# Patient Record
Sex: MALE | Race: AMERICAN INDIAN OR ALASKA NATIVE | NOT HISPANIC OR LATINO | URBAN - METROPOLITAN AREA
[De-identification: names, ages, dates, MRNs, and addresses within clinical notes are randomized per-mention and may not be internally consistent; named-entity substitution may affect disease eponyms.]

---

## 2024-10-09 DIAGNOSIS — I71.40 AAA (ABDOMINAL AORTIC ANEURYSM): Primary | ICD-10-CM

## 2024-10-11 RX ORDER — LOSARTAN POTASSIUM 50 MG/1
50 TABLET ORAL
COMMUNITY
Start: 2024-04-23 | End: 2024-10-14

## 2024-10-11 RX ORDER — CIPROFLOXACIN 500 MG/1
500 TABLET ORAL 2 TIMES DAILY
COMMUNITY
Start: 2024-05-10 | End: 2024-10-14

## 2024-10-11 RX ORDER — APIXABAN 5 MG/1
5 TABLET, FILM COATED ORAL 2 TIMES DAILY
COMMUNITY
Start: 2024-06-18 | End: 2024-10-14

## 2024-10-11 RX ORDER — ALPRAZOLAM 0.5 MG/1
1 TABLET ORAL NIGHTLY PRN
COMMUNITY
Start: 2024-09-05

## 2024-10-11 RX ORDER — CARVEDILOL 3.12 MG/1
3.12 TABLET ORAL
COMMUNITY
Start: 2024-07-25 | End: 2024-10-14

## 2024-10-11 RX ORDER — MOXIFLOXACIN 5 MG/ML
SOLUTION/ DROPS OPHTHALMIC
COMMUNITY
Start: 2024-08-29 | End: 2024-10-14

## 2024-10-11 RX ORDER — TEMAZEPAM 30 MG/1
1 CAPSULE ORAL NIGHTLY PRN
COMMUNITY
Start: 2024-09-20

## 2024-10-11 RX ORDER — LISINOPRIL 5 MG/1
1 TABLET ORAL EVERY MORNING
COMMUNITY
Start: 2024-07-25

## 2024-10-11 RX ORDER — IBUPROFEN 200 MG
1 TABLET ORAL
COMMUNITY
Start: 2024-06-18 | End: 2024-10-14

## 2024-10-11 RX ORDER — ATORVASTATIN CALCIUM 40 MG/1
1 TABLET, FILM COATED ORAL EVERY MORNING
COMMUNITY
Start: 2024-09-14

## 2024-10-11 RX ORDER — CLOPIDOGREL BISULFATE 75 MG/1
1 TABLET ORAL EVERY MORNING
COMMUNITY
Start: 2024-07-25 | End: 2024-10-14

## 2024-10-11 RX ORDER — TORSEMIDE 20 MG/1
20 TABLET ORAL DAILY
COMMUNITY
Start: 2024-06-05 | End: 2024-10-14

## 2024-10-11 RX ORDER — RANOLAZINE 500 MG/1
500 TABLET, EXTENDED RELEASE ORAL 2 TIMES DAILY
COMMUNITY
Start: 2024-06-05 | End: 2024-10-14

## 2024-10-11 RX ORDER — SULFAMETHOXAZOLE AND TRIMETHOPRIM 800; 160 MG/1; MG/1
1 TABLET ORAL 2 TIMES DAILY
COMMUNITY
Start: 2024-05-10 | End: 2024-10-14

## 2024-10-11 RX ORDER — IPRATROPIUM BROMIDE AND ALBUTEROL 20; 100 UG/1; UG/1
1 SPRAY, METERED RESPIRATORY (INHALATION) 4 TIMES DAILY
COMMUNITY

## 2024-10-11 RX ORDER — RAMELTEON 8 MG/1
8 TABLET ORAL NIGHTLY
COMMUNITY
Start: 2024-06-26 | End: 2024-10-14

## 2024-10-11 RX ORDER — BUDESONIDE, GLYCOPYRROLATE, AND FORMOTEROL FUMARATE 160; 9; 4.8 UG/1; UG/1; UG/1
AEROSOL, METERED RESPIRATORY (INHALATION)
COMMUNITY

## 2024-10-11 RX ORDER — PANTOPRAZOLE SODIUM 40 MG/1
1 TABLET, DELAYED RELEASE ORAL EVERY MORNING
COMMUNITY
Start: 2024-09-14

## 2024-10-11 RX ORDER — METOPROLOL SUCCINATE 25 MG/1
25 TABLET, EXTENDED RELEASE ORAL
COMMUNITY
Start: 2024-06-05 | End: 2024-10-14

## 2024-10-11 RX ORDER — TORSEMIDE 10 MG/1
1 TABLET ORAL EVERY MORNING
COMMUNITY
Start: 2024-09-18

## 2024-10-11 RX ORDER — ROSUVASTATIN CALCIUM 20 MG/1
20 TABLET, COATED ORAL
COMMUNITY
Start: 2024-04-23 | End: 2024-10-14

## 2024-10-11 RX ORDER — PREDNISOLONE ACETATE 10 MG/ML
SUSPENSION/ DROPS OPHTHALMIC
COMMUNITY
Start: 2024-08-29 | End: 2024-10-14

## 2024-10-14 ENCOUNTER — OFFICE VISIT (OUTPATIENT)
Dept: VASCULAR SURGERY | Facility: CLINIC | Age: 64
End: 2024-10-14
Payer: MEDICARE

## 2024-10-14 VITALS
SYSTOLIC BLOOD PRESSURE: 141 MMHG | HEART RATE: 54 BPM | HEIGHT: 66 IN | BODY MASS INDEX: 18.48 KG/M2 | DIASTOLIC BLOOD PRESSURE: 89 MMHG | WEIGHT: 115 LBS

## 2024-10-14 DIAGNOSIS — I25.10 CORONARY ARTERY DISEASE, UNSPECIFIED VESSEL OR LESION TYPE, UNSPECIFIED WHETHER ANGINA PRESENT, UNSPECIFIED WHETHER NATIVE OR TRANSPLANTED HEART: ICD-10-CM

## 2024-10-14 DIAGNOSIS — I71.43 INFRARENAL ABDOMINAL AORTIC ANEURYSM (AAA) WITHOUT RUPTURE: Primary | ICD-10-CM

## 2024-10-14 PROCEDURE — 99204 OFFICE O/P NEW MOD 45 MIN: CPT | Mod: ,,, | Performed by: SURGERY

## 2024-10-14 NOTE — H&P (VIEW-ONLY)
"    [facility] Vascular - Clinic Note  Gerhard Platt MD      Patient Name: Heriberto Qiu                   : 1960      MRN: [id number]   Visit Date: 10/14/2024       History Present Illness     Reason for Visit: Abdominal Aortic Aneurysm    Mr. Qiu presents to the clinic for evaluation of abdominal aortic aneurysm.  Since 64-year-old male referred over by Dr. Bay in [place] for abdominal aortic aneurysm repair.  He states he has known about this aneurysm for several years.  He denies any family history of aneurysmal disease.  He is a former smoker who quit 1 year ago.  He was evaluated in [place] felt to need a higher level of care for aneurysm repair.  He does have coronary artery disease with triple-vessel disease being managed medically as he was deemed not a candidate for intervention.      REVIEW OF SYSTEMS:  12 point review of systems conducted, negative except as stated in the history of present illness. See HPI for details.        Physical Exam      Vitals:    10/14/24 1104 10/14/24 1105   BP: 134/86 (!) 141/89   Pulse: (!) 56 (!) 54   Weight: 52.2 kg (115 lb)    Height: 5' 6" (1.676 m)           General: well-nourished, no acute distress, and healthy appearing, alert, pleasant, conversant, and oriented  Neurologic: cranial nerves are grossly intact, no neurologic deficits, no motor deficits, and no sensory deficits  Neck/Chest: normal , soft without lymphadenopathy, and no carotid bruits noted  Respiratory: breathing easily, without respiratory distress, and normal breath sounds  Abdomen: normal, soft, and palpable pulsatile mass  Cardiology: regular rate and rhythm and no audible murmur    Upper Extremity Arterial Exam:   Right - radial is palpable and brachial is palpable  Left - radial is palpable and brachial is palpable    Lower Extremity Arterial Exam:  Right - femoral is palpable and dorsalis pedis is palpable  Left - femoral is palpable and dorsalis pedis is " palpable    Musculoskeletal:   Upper Extremity: normal bilateral hand function  Lower Extremity: no edema present to bilateral lower extremities           Assessment and Plan     Mr. Qiu is a 64 y.o. male with a 6 cm AAA.  I do think he is amenable to endovascular therapy.  It will be slightly more complex due to difficulty with common femoral and external iliac access.  He also has a short neck, however I think it is amenable to treatment with a stent graft and endo anchors.  I did discuss with him we will need to get cardiac risk assessment prior to proceeding.  He did not want want to return for another visit due to difficulty traveling from [place].  I did state that we can call to confirm surgery day and whether to proceed once we have risk assessment.  I did explain this procedure, risks and benefits to him.  We will determine next steps once has been risk stratified by Cardiology        1. AAA (abdominal aortic aneurysm)  - Ambulatory referral/consult to Vascular Surgery    2. Coronary artery disease, unspecified vessel or lesion type, unspecified whether angina present, unspecified whether native or transplanted heart          Imaging Obtained/Reviewed   Study: CTA aorta/runoff  Date:   10/3/24  Shows a 6 cm infrarenal AAA.  This does have a relatively short neck.  Common iliacs are amenable to endovascular therapy.  His external iliacs are somewhat small and calcified as are his common femoral arteries.      Medical History     Past Medical History:   Diagnosis Date    AAA (abdominal aortic aneurysm)     CAD (coronary artery disease)     CHF (congestive heart failure)     COPD (chronic obstructive pulmonary disease)     HTN (hypertension)     Ischemic cardiomyopathy 10/04/2024    Paroxysmal atrial fibrillation     PVD (peripheral vascular disease)      Past Surgical History:   Procedure Laterality Date    SMALL INTESTINE SURGERY       Family History   Problem Relation Name Age of Onset     Heart disease Mother      Heart attack Father       Social History     Socioeconomic History    Marital status: Other   Tobacco Use    Smoking status: Former     Current packs/day: 0.00     Types: Cigarettes     Quit date:      Years since quittin.7    Smokeless tobacco: Never   Substance and Sexual Activity    Alcohol use: Never     Current Outpatient Medications   Medication Instructions    ALPRAZolam (XANAX) 0.5 MG tablet 1 tablet, Nightly PRN    atorvastatin (LIPITOR) 40 MG tablet 1 tablet, Every morning    BREZTRI AEROSPHERE 160-9-4.8 mcg/actuation HFAA Inhale into the lungs.    COMBIVENT RESPIMAT  mcg/actuation inhaler 1 puff, 4 times daily    lisinopriL (PRINIVIL,ZESTRIL) 5 MG tablet 1 tablet, Every morning    pantoprazole (PROTONIX) 40 MG tablet 1 tablet, Every morning    temazepam (RESTORIL) 30 mg capsule 1 capsule, Nightly PRN    torsemide (DEMADEX) 10 MG Tab 1 tablet, Every morning     Review of patient's allergies indicates:   Allergen Reactions    Cefadroxil Hives       Patient Care Team:  Juan Carlos Oshea MD as PCP - General  Lory Bay MD (Cardiovascular Disease)  Gerhard Platt MD as Consulting Physician (Vascular Surgery)        No follow-ups on file. In addition to their scheduled follow up, the patient has also been instructed to follow up on as needed basis.     No future appointments.

## 2024-10-14 NOTE — PROGRESS NOTES
"    [facility] Vascular - Clinic Note  Gerhard Platt MD      Patient Name: Heriberto Qiu                   : 1960      MRN: [id number]   Visit Date: 10/14/2024       History Present Illness     Reason for Visit: Abdominal Aortic Aneurysm    Mr. Qiu presents to the clinic for evaluation of abdominal aortic aneurysm.  Since 64-year-old male referred over by Dr. Bay in [place] for abdominal aortic aneurysm repair.  He states he has known about this aneurysm for several years.  He denies any family history of aneurysmal disease.  He is a former smoker who quit 1 year ago.  He was evaluated in [place] felt to need a higher level of care for aneurysm repair.  He does have coronary artery disease with triple-vessel disease being managed medically as he was deemed not a candidate for intervention.      REVIEW OF SYSTEMS:  12 point review of systems conducted, negative except as stated in the history of present illness. See HPI for details.        Physical Exam      Vitals:    10/14/24 1104 10/14/24 1105   BP: 134/86 (!) 141/89   Pulse: (!) 56 (!) 54   Weight: 52.2 kg (115 lb)    Height: 5' 6" (1.676 m)           General: well-nourished, no acute distress, and healthy appearing, alert, pleasant, conversant, and oriented  Neurologic: cranial nerves are grossly intact, no neurologic deficits, no motor deficits, and no sensory deficits  Neck/Chest: normal , soft without lymphadenopathy, and no carotid bruits noted  Respiratory: breathing easily, without respiratory distress, and normal breath sounds  Abdomen: normal, soft, and palpable pulsatile mass  Cardiology: regular rate and rhythm and no audible murmur    Upper Extremity Arterial Exam:   Right - radial is palpable and brachial is palpable  Left - radial is palpable and brachial is palpable    Lower Extremity Arterial Exam:  Right - femoral is palpable and dorsalis pedis is palpable  Left - femoral is palpable and dorsalis pedis is " palpable    Musculoskeletal:   Upper Extremity: normal bilateral hand function  Lower Extremity: no edema present to bilateral lower extremities           Assessment and Plan     Mr. Qiu is a 64 y.o. male with a 6 cm AAA.  I do think he is amenable to endovascular therapy.  It will be slightly more complex due to difficulty with common femoral and external iliac access.  He also has a short neck, however I think it is amenable to treatment with a stent graft and endo anchors.  I did discuss with him we will need to get cardiac risk assessment prior to proceeding.  He did not want want to return for another visit due to difficulty traveling from [place].  I did state that we can call to confirm surgery day and whether to proceed once we have risk assessment.  I did explain this procedure, risks and benefits to him.  We will determine next steps once has been risk stratified by Cardiology        1. AAA (abdominal aortic aneurysm)  - Ambulatory referral/consult to Vascular Surgery    2. Coronary artery disease, unspecified vessel or lesion type, unspecified whether angina present, unspecified whether native or transplanted heart          Imaging Obtained/Reviewed   Study: CTA aorta/runoff  Date:   10/3/24  Shows a 6 cm infrarenal AAA.  This does have a relatively short neck.  Common iliacs are amenable to endovascular therapy.  His external iliacs are somewhat small and calcified as are his common femoral arteries.      Medical History     Past Medical History:   Diagnosis Date    AAA (abdominal aortic aneurysm)     CAD (coronary artery disease)     CHF (congestive heart failure)     COPD (chronic obstructive pulmonary disease)     HTN (hypertension)     Ischemic cardiomyopathy 10/04/2024    Paroxysmal atrial fibrillation     PVD (peripheral vascular disease)      Past Surgical History:   Procedure Laterality Date    SMALL INTESTINE SURGERY       Family History   Problem Relation Name Age of Onset    Heart disease  Mother      Heart attack Father       Social History     Socioeconomic History    Marital status: Other   Tobacco Use    Smoking status: Former     Current packs/day: 0.00     Types: Cigarettes     Quit date:      Years since quittin.7    Smokeless tobacco: Never   Substance and Sexual Activity    Alcohol use: Never     Current Outpatient Medications   Medication Instructions    ALPRAZolam (XANAX) 0.5 MG tablet 1 tablet, Nightly PRN    atorvastatin (LIPITOR) 40 MG tablet 1 tablet, Every morning    BREZTRI AEROSPHERE 160-9-4.8 mcg/actuation HFAA Inhale into the lungs.    COMBIVENT RESPIMAT  mcg/actuation inhaler 1 puff, 4 times daily    lisinopriL (PRINIVIL,ZESTRIL) 5 MG tablet 1 tablet, Every morning    pantoprazole (PROTONIX) 40 MG tablet 1 tablet, Every morning    temazepam (RESTORIL) 30 mg capsule 1 capsule, Nightly PRN    torsemide (DEMADEX) 10 MG Tab 1 tablet, Every morning     Review of patient's allergies indicates:   Allergen Reactions    Cefadroxil Hives       Patient Care Team:  Juan Carlos Oshea MD as PCP - General  Lory Bay MD (Cardiovascular Disease)  Gerhard Platt MD as Consulting Physician (Vascular Surgery)        No follow-ups on file. In addition to their scheduled follow up, the patient has also been instructed to follow up on as needed basis.     No future appointments.

## 2024-10-14 NOTE — LETTER
[facility] Clinic           Medical Record Request  Date: 10/11/2024      PLEASE PROVIDE ALL INFORMATION AS REQUESTED FOR:      Mr. Heriberto Qiu    : 1960       Please PUSH images to [facility] on [employer] from recent CT angiogram aorta with runoff obtained on 10/3/2024.    Patient has upcoming appointment 10/14/24.      Thank you for your help in this matter.    Sincerely,        Gerhard Platt MD  [facility]  Phone: [phone]  Fax: [phone]

## 2024-10-23 DIAGNOSIS — I71.43 INFRARENAL ABDOMINAL AORTIC ANEURYSM (AAA) WITHOUT RUPTURE: Primary | ICD-10-CM

## 2024-10-25 DIAGNOSIS — I71.43 INFRARENAL ABDOMINAL AORTIC ANEURYSM (AAA) WITHOUT RUPTURE: Primary | ICD-10-CM

## 2024-10-25 RX ORDER — NAPROXEN SODIUM 220 MG/1
1 TABLET, FILM COATED ORAL EVERY MORNING
COMMUNITY
Start: 2024-10-24 | End: 2025-10-24

## 2024-10-25 RX ORDER — SODIUM CHLORIDE 9 MG/ML
INJECTION, SOLUTION INTRAVENOUS CONTINUOUS
OUTPATIENT
Start: 2024-10-25

## 2024-10-25 RX ORDER — CLOPIDOGREL BISULFATE 75 MG/1
1 TABLET ORAL EVERY MORNING
COMMUNITY
Start: 2024-10-24

## 2024-10-30 ENCOUNTER — ANESTHESIA EVENT (OUTPATIENT)
Dept: SURGERY | Facility: HOSPITAL | Age: 64
End: 2024-10-30
Payer: MEDICARE

## 2024-11-04 ENCOUNTER — TELEPHONE (OUTPATIENT)
Dept: VASCULAR SURGERY | Facility: CLINIC | Age: 64
End: 2024-11-04
Payer: COMMERCIAL

## 2024-11-04 NOTE — PRE-PROCEDURE INSTRUCTIONS
"[facility]: Outpatient Surgery  Preprocedure Check-In Instructions     Your surgeon's office will call with your time of arrival.    We ask patients to arrive about 2 hours before surgery to allow for enough time to review your health history & medications, start your IV, complete any outstanding labwork or tests, and meet your Anesthesiologist.    Expectations: "Because of inconsistent procedure completion times, an unexpected wait may occur. The Physicians would like you to be here to prepare in the event they run ahead of time. We will make you as comfortable as possible and keep you informed. We apologize in advance if this happens."    You will arrive at [facility], [address], [place].  Enter through the [place] entrance next to the Emergency Room, and come to the 6th floor to the Outpatient Surgery Department.     Visitory Policy:  You are allowed 2 adult visitors to be with you in the hospital. All hospital visitors should be in good current health.  No small children.     What to Bring:  Please have your ID, insurance cards, and all home medication bottles with you at check in.  Bring your CPAP machine if one is used at home.     Fasting:  Nothing to eat after midnight the night before your procedure. This includes no gum and/or tobacco products. You may have clear liquids limited to only: WATER, GATORADE, POWERADE and children can also have PEDIALYTE AND/OR APPLE JUICE , up until 2 hours before your arrival time.   Follow your doctor's instructions for taking any medications on the morning of your procedure.  If no instructions for taking medications were given, do not take any medications but bring your medications in their bottles to your procedure check in.     Follow your doctor's preoperative instructions regarding skin prep, bowel prep, bathing, or showering prior to your procedure.  If any special soaps were provided to you, please use according to " your doctor's instructions. If no instructions were given from your doctor, take a good bath or shower with antibacterial soap the night before and the morning of your procedure.  On the morning of procedure, wear loose, comfortable clothing.  No lotions, makeup, perfumes, colognes, deodorant, or jewelry to your procedure.  Removable items (glasses, contact lenses, dentures, retainers, hearing aids) need to be removed for your procedure.  Bring your storage containers for these items if you wear them.     Artificial nails, body jewelry, eyelash extensions, and/or hair extensions with metal clips are not allowed during your surgery.  If you currently wear any of these items, please arrange for them to be removed prior to your arrival to the hospital.     Outpatient or Same Day Surgeries:  Any patients receiving sedation/anesthesia are advised not to drive for 24 hours after their procedure.  We do not allow patients to drive themselves home after discharge.  If you are going home after your procedure, please have someone available to drive you home from the hospital.        You may call the Outpatient Surgery Department at [phone] with any questions or concerns.  We are looking forward to meeting you and taking great care of you for your procedure.  Thank you for choosing [facility] for your surgical needs.

## 2024-11-04 NOTE — CLINICAL REVIEW
outside BMP and cardiology documents reviewed. Awaiting other preop testing. sd //     Plavix to continue. CBC/EKG/CXR reviewed. cardiology notes utd. Angio noted. CRA provided. chart review complete. ccv

## 2024-11-05 ENCOUNTER — HOSPITAL ENCOUNTER (INPATIENT)
Facility: HOSPITAL | Age: 64
LOS: 5 days | Discharge: HOME HEALTH | DRG: 268 | End: 2024-11-10
Attending: SURGERY | Admitting: SURGERY
Payer: MEDICARE

## 2024-11-05 ENCOUNTER — ANESTHESIA (OUTPATIENT)
Dept: SURGERY | Facility: HOSPITAL | Age: 64
End: 2024-11-05
Payer: MEDICARE

## 2024-11-05 DIAGNOSIS — I49.9 ABNORMAL HEART RHYTHM: ICD-10-CM

## 2024-11-05 DIAGNOSIS — I71.43 INFRARENAL ABDOMINAL AORTIC ANEURYSM (AAA) WITHOUT RUPTURE: Primary | ICD-10-CM

## 2024-11-05 LAB
ABORH RETYPE: NORMAL
ALBUMIN SERPL-MCNC: 3.3 G/DL (ref 3.4–4.8)
ALBUMIN/GLOB SERPL: 0.9 RATIO (ref 1.1–2)
ALP SERPL-CCNC: 70 UNIT/L (ref 40–150)
ALT SERPL-CCNC: 6 UNIT/L (ref 0–55)
ANION GAP SERPL CALC-SCNC: 16 MEQ/L
AST SERPL-CCNC: 15 UNIT/L (ref 5–34)
BILIRUB SERPL-MCNC: 0.4 MG/DL
BUN SERPL-MCNC: 68.8 MG/DL (ref 8.4–25.7)
CALCIUM SERPL-MCNC: 9.5 MG/DL (ref 8.8–10)
CHLORIDE SERPL-SCNC: 103 MMOL/L (ref 98–107)
CO2 SERPL-SCNC: 22 MMOL/L (ref 23–31)
CREAT SERPL-MCNC: 2.91 MG/DL (ref 0.72–1.25)
CREAT/UREA NIT SERPL: 24
GFR SERPLBLD CREATININE-BSD FMLA CKD-EPI: 23 ML/MIN/1.73/M2
GLOBULIN SER-MCNC: 3.7 GM/DL (ref 2.4–3.5)
GLUCOSE SERPL-MCNC: 147 MG/DL (ref 82–115)
GROUP & RH: NORMAL
INDIRECT COOMBS: NORMAL
MAGNESIUM SERPL-MCNC: 2.4 MG/DL (ref 1.6–2.6)
POC ACTIVATED CLOTTING TIME K: 244 SEC (ref 74–137)
POTASSIUM SERPL-SCNC: 4.1 MMOL/L (ref 3.5–5.1)
PROT SERPL-MCNC: 7 GM/DL (ref 5.8–7.6)
SAMPLE: ABNORMAL
SODIUM SERPL-SCNC: 141 MMOL/L (ref 136–145)
SPECIMEN OUTDATE: NORMAL

## 2024-11-05 PROCEDURE — 63600175 PHARM REV CODE 636 W HCPCS: Performed by: ANESTHESIOLOGY

## 2024-11-05 PROCEDURE — 99900035 HC TECH TIME PER 15 MIN (STAT)

## 2024-11-05 PROCEDURE — 25000003 PHARM REV CODE 250: Performed by: SURGERY

## 2024-11-05 PROCEDURE — 36000706: Performed by: SURGERY

## 2024-11-05 PROCEDURE — 76937 US GUIDE VASCULAR ACCESS: CPT | Performed by: NURSE ANESTHETIST, CERTIFIED REGISTERED

## 2024-11-05 PROCEDURE — 27000221 HC OXYGEN, UP TO 24 HOURS

## 2024-11-05 PROCEDURE — 71000033 HC RECOVERY, INTIAL HOUR: Performed by: SURGERY

## 2024-11-05 PROCEDURE — 63600175 PHARM REV CODE 636 W HCPCS: Performed by: SURGERY

## 2024-11-05 PROCEDURE — D9220A PRA ANESTHESIA: Mod: CRNA,,, | Performed by: NURSE ANESTHETIST, CERTIFIED REGISTERED

## 2024-11-05 PROCEDURE — 25000003 PHARM REV CODE 250: Performed by: ANESTHESIOLOGY

## 2024-11-05 PROCEDURE — 86901 BLOOD TYPING SEROLOGIC RH(D): CPT | Performed by: SURGERY

## 2024-11-05 PROCEDURE — 04V03DZ RESTRICTION OF ABDOMINAL AORTA WITH INTRALUMINAL DEVICE, PERCUTANEOUS APPROACH: ICD-10-PCS | Performed by: SURGERY

## 2024-11-05 PROCEDURE — 27201423 OPTIME MED/SURG SUP & DEVICES STERILE SUPPLY: Performed by: SURGERY

## 2024-11-05 PROCEDURE — 37000008 HC ANESTHESIA 1ST 15 MINUTES: Performed by: SURGERY

## 2024-11-05 PROCEDURE — 21400001 HC TELEMETRY ROOM

## 2024-11-05 PROCEDURE — 37000009 HC ANESTHESIA EA ADD 15 MINS: Performed by: SURGERY

## 2024-11-05 PROCEDURE — C1769 GUIDE WIRE: HCPCS | Performed by: SURGERY

## 2024-11-05 PROCEDURE — 11000001 HC ACUTE MED/SURG PRIVATE ROOM

## 2024-11-05 PROCEDURE — 25500020 PHARM REV CODE 255: Performed by: SURGERY

## 2024-11-05 PROCEDURE — 04FH3ZZ FRAGMENTATION OF RIGHT EXTERNAL ILIAC ARTERY, PERCUTANEOUS APPROACH: ICD-10-PCS | Performed by: SURGERY

## 2024-11-05 PROCEDURE — 27800903 OPTIME MED/SURG SUP & DEVICES OTHER IMPLANTS: Performed by: SURGERY

## 2024-11-05 PROCEDURE — 71000039 HC RECOVERY, EACH ADD'L HOUR: Performed by: SURGERY

## 2024-11-05 PROCEDURE — 36000707: Performed by: SURGERY

## 2024-11-05 PROCEDURE — C1760 CLOSURE DEV, VASC: HCPCS | Performed by: SURGERY

## 2024-11-05 PROCEDURE — 86923 COMPATIBILITY TEST ELECTRIC: CPT | Performed by: SURGERY

## 2024-11-05 PROCEDURE — 93005 ELECTROCARDIOGRAM TRACING: CPT

## 2024-11-05 PROCEDURE — 36415 COLL VENOUS BLD VENIPUNCTURE: CPT | Performed by: SURGERY

## 2024-11-05 PROCEDURE — C1768 GRAFT, VASCULAR: HCPCS | Performed by: SURGERY

## 2024-11-05 PROCEDURE — 63600175 PHARM REV CODE 636 W HCPCS: Performed by: NURSE ANESTHETIST, CERTIFIED REGISTERED

## 2024-11-05 PROCEDURE — 04CK0ZZ EXTIRPATION OF MATTER FROM RIGHT FEMORAL ARTERY, OPEN APPROACH: ICD-10-PCS | Performed by: SURGERY

## 2024-11-05 PROCEDURE — 25000003 PHARM REV CODE 250: Performed by: NURSE ANESTHETIST, CERTIFIED REGISTERED

## 2024-11-05 PROCEDURE — C1894 INTRO/SHEATH, NON-LASER: HCPCS | Performed by: SURGERY

## 2024-11-05 PROCEDURE — 93010 ELECTROCARDIOGRAM REPORT: CPT | Mod: ,,, | Performed by: INTERNAL MEDICINE

## 2024-11-05 PROCEDURE — 80053 COMPREHEN METABOLIC PANEL: CPT | Performed by: SURGERY

## 2024-11-05 PROCEDURE — 36620 INSERTION CATHETER ARTERY: CPT | Mod: 59,,, | Performed by: ANESTHESIOLOGY

## 2024-11-05 PROCEDURE — 04UK0KZ SUPPLEMENT RIGHT FEMORAL ARTERY WITH NONAUTOLOGOUS TISSUE SUBSTITUTE, OPEN APPROACH: ICD-10-PCS | Performed by: SURGERY

## 2024-11-05 PROCEDURE — 04FJ3ZZ FRAGMENTATION OF LEFT EXTERNAL ILIAC ARTERY, PERCUTANEOUS APPROACH: ICD-10-PCS | Performed by: SURGERY

## 2024-11-05 PROCEDURE — 76937 US GUIDE VASCULAR ACCESS: CPT | Mod: 26,,, | Performed by: ANESTHESIOLOGY

## 2024-11-05 PROCEDURE — D9220A PRA ANESTHESIA: Mod: ANES,,, | Performed by: ANESTHESIOLOGY

## 2024-11-05 PROCEDURE — 83735 ASSAY OF MAGNESIUM: CPT | Performed by: SURGERY

## 2024-11-05 PROCEDURE — C1725 CATH, TRANSLUMIN NON-LASER: HCPCS | Performed by: SURGERY

## 2024-11-05 DEVICE — XENOSURE BIOLOGIC PATCH, 0.8CM X 8CM, EIFU
Type: IMPLANTABLE DEVICE | Site: AORTA | Status: FUNCTIONAL
Brand: XENOSURE BIOLOGIC PATCH

## 2024-11-05 DEVICE — IMPLANTABLE DEVICE: Type: IMPLANTABLE DEVICE | Site: AORTA | Status: FUNCTIONAL

## 2024-11-05 RX ORDER — NAPROXEN SODIUM 220 MG/1
81 TABLET, FILM COATED ORAL EVERY MORNING
Status: DISCONTINUED | OUTPATIENT
Start: 2024-11-06 | End: 2024-11-06

## 2024-11-05 RX ORDER — HYDROCODONE BITARTRATE AND ACETAMINOPHEN 5; 325 MG/1; MG/1
1 TABLET ORAL EVERY 4 HOURS PRN
Status: DISCONTINUED | OUTPATIENT
Start: 2024-11-05 | End: 2024-11-10 | Stop reason: HOSPADM

## 2024-11-05 RX ORDER — HEPARIN SODIUM 1000 [USP'U]/ML
INJECTION, SOLUTION INTRAVENOUS; SUBCUTANEOUS
Status: DISCONTINUED | OUTPATIENT
Start: 2024-11-05 | End: 2024-11-05

## 2024-11-05 RX ORDER — FAMOTIDINE 10 MG/ML
20 INJECTION INTRAVENOUS ONCE
Status: COMPLETED | OUTPATIENT
Start: 2024-11-05 | End: 2024-11-05

## 2024-11-05 RX ORDER — MUPIROCIN 20 MG/G
OINTMENT TOPICAL 2 TIMES DAILY
Status: DISCONTINUED | OUTPATIENT
Start: 2024-11-05 | End: 2024-11-10 | Stop reason: HOSPADM

## 2024-11-05 RX ORDER — SODIUM CHLORIDE 0.9 % (FLUSH) 0.9 %
10 SYRINGE (ML) INJECTION
Status: DISCONTINUED | OUTPATIENT
Start: 2024-11-05 | End: 2024-11-05 | Stop reason: HOSPADM

## 2024-11-05 RX ORDER — ONDANSETRON HYDROCHLORIDE 2 MG/ML
4 INJECTION, SOLUTION INTRAVENOUS EVERY 6 HOURS PRN
Status: DISCONTINUED | OUTPATIENT
Start: 2024-11-05 | End: 2024-11-10 | Stop reason: HOSPADM

## 2024-11-05 RX ORDER — HEPARIN SODIUM 200 [USP'U]/100ML
INJECTION, SOLUTION INTRAVENOUS
Status: DISCONTINUED | OUTPATIENT
Start: 2024-11-05 | End: 2024-11-05 | Stop reason: HOSPADM

## 2024-11-05 RX ORDER — LIDOCAINE HYDROCHLORIDE 10 MG/ML
1 INJECTION, SOLUTION EPIDURAL; INFILTRATION; INTRACAUDAL; PERINEURAL ONCE
Status: DISCONTINUED | OUTPATIENT
Start: 2024-11-05 | End: 2024-11-05 | Stop reason: HOSPADM

## 2024-11-05 RX ORDER — MIDAZOLAM HYDROCHLORIDE 1 MG/ML
INJECTION INTRAMUSCULAR; INTRAVENOUS
Status: DISCONTINUED | OUTPATIENT
Start: 2024-11-05 | End: 2024-11-05

## 2024-11-05 RX ORDER — VANCOMYCIN HCL IN 5 % DEXTROSE 1G/250ML
1000 PLASTIC BAG, INJECTION (ML) INTRAVENOUS
Status: DISCONTINUED | OUTPATIENT
Start: 2024-11-05 | End: 2024-11-05 | Stop reason: SDUPTHER

## 2024-11-05 RX ORDER — CALCIUM CHLORIDE INJECTION 100 MG/ML
INJECTION, SOLUTION INTRAVENOUS
Status: DISCONTINUED | OUTPATIENT
Start: 2024-11-05 | End: 2024-11-05

## 2024-11-05 RX ORDER — PROTAMINE SULFATE 10 MG/ML
INJECTION, SOLUTION INTRAVENOUS
Status: DISCONTINUED | OUTPATIENT
Start: 2024-11-05 | End: 2024-11-05

## 2024-11-05 RX ORDER — EPHEDRINE SULFATE 50 MG/ML
INJECTION, SOLUTION INTRAVENOUS
Status: DISCONTINUED | OUTPATIENT
Start: 2024-11-05 | End: 2024-11-05

## 2024-11-05 RX ORDER — ATORVASTATIN CALCIUM 40 MG/1
40 TABLET, FILM COATED ORAL EVERY MORNING
Status: DISCONTINUED | OUTPATIENT
Start: 2024-11-05 | End: 2024-11-06

## 2024-11-05 RX ORDER — GLUCAGON 1 MG
1 KIT INJECTION
Status: DISCONTINUED | OUTPATIENT
Start: 2024-11-05 | End: 2024-11-05 | Stop reason: HOSPADM

## 2024-11-05 RX ORDER — LIDOCAINE HYDROCHLORIDE 20 MG/ML
INJECTION, SOLUTION EPIDURAL; INFILTRATION; INTRACAUDAL; PERINEURAL
Status: DISCONTINUED | OUTPATIENT
Start: 2024-11-05 | End: 2024-11-05

## 2024-11-05 RX ORDER — ROCURONIUM BROMIDE 10 MG/ML
INJECTION, SOLUTION INTRAVENOUS
Status: DISCONTINUED | OUTPATIENT
Start: 2024-11-05 | End: 2024-11-05

## 2024-11-05 RX ORDER — GLYCOPYRROLATE 0.2 MG/ML
0.2 INJECTION INTRAMUSCULAR; INTRAVENOUS ONCE
Status: COMPLETED | OUTPATIENT
Start: 2024-11-05 | End: 2024-11-05

## 2024-11-05 RX ORDER — SODIUM CHLORIDE 9 MG/ML
INJECTION, SOLUTION INTRAVENOUS CONTINUOUS
Status: ACTIVE | OUTPATIENT
Start: 2024-11-05 | End: 2024-11-05

## 2024-11-05 RX ORDER — HYDROCODONE BITARTRATE AND ACETAMINOPHEN 5; 325 MG/1; MG/1
1 TABLET ORAL
Status: DISCONTINUED | OUTPATIENT
Start: 2024-11-05 | End: 2024-11-05 | Stop reason: HOSPADM

## 2024-11-05 RX ORDER — PROPOFOL 10 MG/ML
VIAL (ML) INTRAVENOUS
Status: DISCONTINUED | OUTPATIENT
Start: 2024-11-05 | End: 2024-11-05

## 2024-11-05 RX ORDER — CARVEDILOL 3.12 MG/1
3.12 TABLET ORAL 2 TIMES DAILY WITH MEALS
Status: DISCONTINUED | OUTPATIENT
Start: 2024-11-05 | End: 2024-11-10 | Stop reason: HOSPADM

## 2024-11-05 RX ORDER — FENTANYL CITRATE 50 UG/ML
25 INJECTION, SOLUTION INTRAMUSCULAR; INTRAVENOUS EVERY 5 MIN PRN
Status: DISCONTINUED | OUTPATIENT
Start: 2024-11-05 | End: 2024-11-05 | Stop reason: HOSPADM

## 2024-11-05 RX ORDER — DEXAMETHASONE SODIUM PHOSPHATE 4 MG/ML
INJECTION, SOLUTION INTRA-ARTICULAR; INTRALESIONAL; INTRAMUSCULAR; INTRAVENOUS; SOFT TISSUE
Status: DISCONTINUED | OUTPATIENT
Start: 2024-11-05 | End: 2024-11-05

## 2024-11-05 RX ORDER — CARVEDILOL 3.12 MG/1
3.12 TABLET ORAL 2 TIMES DAILY WITH MEALS
COMMUNITY

## 2024-11-05 RX ORDER — ETOMIDATE 2 MG/ML
INJECTION INTRAVENOUS
Status: DISCONTINUED | OUTPATIENT
Start: 2024-11-05 | End: 2024-11-05

## 2024-11-05 RX ORDER — ACETAMINOPHEN 10 MG/ML
INJECTION, SOLUTION INTRAVENOUS
Status: DISCONTINUED | OUTPATIENT
Start: 2024-11-05 | End: 2024-11-05

## 2024-11-05 RX ORDER — IOPAMIDOL 612 MG/ML
INJECTION, SOLUTION INTRAVASCULAR
Status: DISCONTINUED | OUTPATIENT
Start: 2024-11-05 | End: 2024-11-05 | Stop reason: HOSPADM

## 2024-11-05 RX ORDER — HYDROMORPHONE HYDROCHLORIDE 2 MG/ML
0.4 INJECTION, SOLUTION INTRAMUSCULAR; INTRAVENOUS; SUBCUTANEOUS EVERY 5 MIN PRN
Status: DISCONTINUED | OUTPATIENT
Start: 2024-11-05 | End: 2024-11-05 | Stop reason: HOSPADM

## 2024-11-05 RX ORDER — DEXTROSE MONOHYDRATE 50 MG/ML
INJECTION, SOLUTION INTRAVENOUS ONCE
Status: DISCONTINUED | OUTPATIENT
Start: 2024-11-05 | End: 2024-11-05 | Stop reason: HOSPADM

## 2024-11-05 RX ORDER — SODIUM CHLORIDE 9 MG/ML
INJECTION, SOLUTION INTRAVENOUS CONTINUOUS
Status: DISCONTINUED | OUTPATIENT
Start: 2024-11-05 | End: 2024-11-07

## 2024-11-05 RX ORDER — HEPARIN SOD,PORCINE/0.9 % NACL 1000/500ML
INTRAVENOUS SOLUTION INTRAVENOUS
Status: DISCONTINUED | OUTPATIENT
Start: 2024-11-05 | End: 2024-11-05 | Stop reason: HOSPADM

## 2024-11-05 RX ORDER — DEXMEDETOMIDINE HYDROCHLORIDE 100 UG/ML
INJECTION, SOLUTION INTRAVENOUS
Status: DISCONTINUED | OUTPATIENT
Start: 2024-11-05 | End: 2024-11-05

## 2024-11-05 RX ORDER — CLOPIDOGREL BISULFATE 75 MG/1
75 TABLET ORAL EVERY MORNING
Status: DISCONTINUED | OUTPATIENT
Start: 2024-11-06 | End: 2024-11-06

## 2024-11-05 RX ORDER — FENTANYL CITRATE 50 UG/ML
INJECTION, SOLUTION INTRAMUSCULAR; INTRAVENOUS
Status: DISCONTINUED | OUTPATIENT
Start: 2024-11-05 | End: 2024-11-05

## 2024-11-05 RX ADMIN — ONDANSETRON 4 MG: 2 INJECTION INTRAMUSCULAR; INTRAVENOUS at 05:11

## 2024-11-05 RX ADMIN — PROTAMINE SULFATE 70 MG: 10 INJECTION, SOLUTION INTRAVENOUS at 12:11

## 2024-11-05 RX ADMIN — MIDAZOLAM HYDROCHLORIDE 2 MG: 1 INJECTION, SOLUTION INTRAMUSCULAR; INTRAVENOUS at 09:11

## 2024-11-05 RX ADMIN — HEPARIN SODIUM 2000 UNITS: 1000 INJECTION, SOLUTION INTRAVENOUS; SUBCUTANEOUS at 12:11

## 2024-11-05 RX ADMIN — HEPARIN SODIUM 2000 UNITS: 1000 INJECTION, SOLUTION INTRAVENOUS; SUBCUTANEOUS at 11:11

## 2024-11-05 RX ADMIN — ACETAMINOPHEN 1000 MG: 10 INJECTION, SOLUTION INTRAVENOUS at 11:11

## 2024-11-05 RX ADMIN — SODIUM CHLORIDE, SODIUM GLUCONATE, SODIUM ACETATE, POTASSIUM CHLORIDE AND MAGNESIUM CHLORIDE: 526; 502; 368; 37; 30 INJECTION, SOLUTION INTRAVENOUS at 09:11

## 2024-11-05 RX ADMIN — ROCURONIUM BROMIDE 50 MG: 10 SOLUTION INTRAVENOUS at 09:11

## 2024-11-05 RX ADMIN — FENTANYL CITRATE 50 MCG: 50 INJECTION, SOLUTION INTRAMUSCULAR; INTRAVENOUS at 10:11

## 2024-11-05 RX ADMIN — SUGAMMADEX 200 MG: 100 INJECTION, SOLUTION INTRAVENOUS at 01:11

## 2024-11-05 RX ADMIN — PROPOFOL 110 MG: 10 INJECTION, EMULSION INTRAVENOUS at 09:11

## 2024-11-05 RX ADMIN — ROCURONIUM BROMIDE 20 MG: 10 SOLUTION INTRAVENOUS at 11:11

## 2024-11-05 RX ADMIN — EPHEDRINE SULFATE 10 MG: 50 INJECTION INTRAVENOUS at 12:11

## 2024-11-05 RX ADMIN — HYDROCODONE BITARTRATE AND ACETAMINOPHEN 1 TABLET: 5; 325 TABLET ORAL at 03:11

## 2024-11-05 RX ADMIN — CALCIUM CHLORIDE INJECTION 0.5 G: 100 INJECTION, SOLUTION INTRAVENOUS at 01:11

## 2024-11-05 RX ADMIN — DEXAMETHASONE SODIUM PHOSPHATE 4 MG: 4 INJECTION, SOLUTION INTRA-ARTICULAR; INTRALESIONAL; INTRAMUSCULAR; INTRAVENOUS; SOFT TISSUE at 10:11

## 2024-11-05 RX ADMIN — DEXMEDETOMIDINE 10 MCG: 200 INJECTION, SOLUTION INTRAVENOUS at 01:11

## 2024-11-05 RX ADMIN — PROTAMINE SULFATE 15 MG: 10 INJECTION, SOLUTION INTRAVENOUS at 01:11

## 2024-11-05 RX ADMIN — CALCIUM CHLORIDE INJECTION 0.5 G: 100 INJECTION, SOLUTION INTRAVENOUS at 12:11

## 2024-11-05 RX ADMIN — PROPOFOL 40 MG: 10 INJECTION, EMULSION INTRAVENOUS at 12:11

## 2024-11-05 RX ADMIN — EPHEDRINE SULFATE 5 MG: 50 INJECTION INTRAVENOUS at 11:11

## 2024-11-05 RX ADMIN — SODIUM CHLORIDE: 9 INJECTION, SOLUTION INTRAVENOUS at 09:11

## 2024-11-05 RX ADMIN — ETOMIDATE 6 MG: 2 INJECTION INTRAVENOUS at 09:11

## 2024-11-05 RX ADMIN — FAMOTIDINE 20 MG: 10 INJECTION, SOLUTION INTRAVENOUS at 07:11

## 2024-11-05 RX ADMIN — GLYCOPYRROLATE 0.2 MG: 0.2 INJECTION INTRAMUSCULAR; INTRAVENOUS at 01:11

## 2024-11-05 RX ADMIN — ROCURONIUM BROMIDE 20 MG: 10 SOLUTION INTRAVENOUS at 10:11

## 2024-11-05 RX ADMIN — VANCOMYCIN HYDROCHLORIDE 1000 MG: 1 INJECTION, POWDER, LYOPHILIZED, FOR SOLUTION INTRAVENOUS at 09:11

## 2024-11-05 RX ADMIN — LIDOCAINE HYDROCHLORIDE 80 MG: 20 INJECTION, SOLUTION INTRAVENOUS at 09:11

## 2024-11-05 RX ADMIN — EPHEDRINE SULFATE 10 MG: 50 INJECTION INTRAVENOUS at 10:11

## 2024-11-05 RX ADMIN — HEPARIN SODIUM 6000 UNITS: 1000 INJECTION, SOLUTION INTRAVENOUS; SUBCUTANEOUS at 10:11

## 2024-11-05 NOTE — OP NOTE
[facility]  Operative Note        Surgery Date:  11/05/2024     Pre-op Diagnosis:    Infrarenal abdominal aortic aneurysm (AAA) without rupture [I71.43]     Post-op Diagnosis:  Same     Procedure(s):  1.  Bilateral open exposure of the common femoral arteries   2.  Right common femoral endarterectomy with bovine pericardial patch angioplasty   3.  Placement of aorto bi-iliac endograft using Medtronic endurant IIS main body 28 x 14 x 103 from the right with left iliac extension 16 x 16 x 124, and right iliac extension 16 x 16 x 93  4. placement transcatheter endo anchors x6  5. Intravascular lithotripsy of bilateral external iliac arteries     Indication for procedure: Heriberto Qiu is a 64 y.o. male who presented with a 6 cm infrarenal AAA.  He had a short neck and was taken for endovascular intervention.    Surgeon:  Gerhard Platt MD    Assistant:  Circulator: Adri Raygoza RN; Pao Turner RN  Physician Assistant: Aldo Knott PA-C  Scrub Person: Roz Russo ST; An Sung ST     Anesthesia:  General     Findings:  Initial angiography showed high-grade stenosis of both external iliacs.  After initial intravascular lithotripsy of these vessels they were adequately sized.  The initial angiography of the aortic aneurysm noted to be consistent with the previous imaging on CT.  On completion angiography there was good flow through the graft distally with no evidence of type 1 endoleak.  Completion angiography showed good flow through the external iliacs.  There were good Doppler signals in the feet on completion of the case    Complications: None     Estimated Blood Loss:  100 mL         Specimens: None    Implants:  Implant Name Type Inv. Item Serial No.  Lot No. LRB No. Used Action   GRAFT STENT ENDURANT 103X14-28 - [id number]  GRAFT STENT ENDURANT 103X14-28 [id number] [employer] [facility] NA N/A 1 Implanted   MEDTRONIC ENDURANT II STENT GRAFT SYSTEM LIMB   [id number]  [employer] NA Left 1 Wasted   MEDTRONIC ENDURANT II STENT GRAFT SYSTEM LIMB   [id number] [employer] NA Left 1 Implanted   MEDTRONIC ENDURANT II STENT GRAFT SYSTEM LIMB   [id number] [employer] NA Right 1 Implanted   GUIDE TRICE-FX EVAR 22MM 62CM - [id number]  GUIDE TRICE-FX EVAR 22MM 62CM NA MEDTRONIC [facility] [id number] N/A 1 Implanted   MEDTRONIC ANCHOR   NA MEDTRONIC [id number] N/A 1 Implanted   PATCH XENOSURE TAPR .8X8CM - [id number]  PATCH XENOSURE TAPR .8X8CM  [facility] VASCULAR [id number] N/A 1 Implanted       Drains: None    Procedure in detail:  After informed consent was obtained, and risks and benefits discussed with the patient, he was brought to the operating room placed supine.  After adequate general anesthesia was obtained, he was prepped and draped in a standard sterile fashion.  An oblique incision was made in the left groin and dissection carried down to the common femoral, profunda, and SFA were each identified.  These were isolated with vessel loops.  Attention was then turned to the right groin.  An oblique incision was made dissection was carried down to the common femoral artery was identified.  I dissected proximally and distally on the common femoral artery and isolated with vessel loops.  5 0 pursestring sutures of Prolene were placed in each artery and each was accessed with a 18 gauge needle.  The patient was systemically heparinized at this point.  A wire was advanced an 8 [place] Leonean sheath placed.  Bilateral angiography was performed.  This showed high-grade stenosis of both external iliacs.  I began on the right.  I advanced a wire up into the aorta and exchanged out over a catheter for an 014 glide is wire.  A 6 mm shockwave intravascular lithotripsy balloon was then placed up the external iliac and intravascular lithotripsy was performed.  Completion angiography showed good luminal gain in the vessel.  I then exchanged out on the left groin and placed an 035 wire up into the aorta.  I exchanged out for the  014 glide is wire again and performed intravascular lithotripsy of the external iliac artery.  Completion angiography showed good luminal gain here as well.  I then advanced a Glidewire advantage up the right common femoral artery into the descending thoracic and exchanged out over a catheter for a double curve Lunderquist wire.  On the left, I advanced a Glidewire advantage up through the sheath to the suprarenal aorta and placed a pigtail catheter.  I then used a 12 Gibraltarian dilator to pre dilate the right iliac system.  I was unable to advance a graft across the iliac into the aorta into a suprarenal position.  Contrast angiography was performed and the renal arteries were marked.  The graft was then deployed to the contralateral gate in an infrarenal position.  I then brought the left femoral wire back and used a Glide catheter to select the contralateral gate.  I advanced my wire exchanged out for a pigtail catheter which I verified I was in the graft with by spinning the pigtail.  Oblique imaging of the left iliac system was then taken from the sheath.  The internal iliac arteries marked an appropriately sized graft was selected.  The sheath was removed and the graft advanced in.  I did have some difficulty so I had to remove the graft.  At this point I was able to advance a 16 Gibraltarian DrySeal into the common iliac.  I then was able to advance the graft limb through the sheath.  The sheath was then withdrawn.  The graft was then deployed up to the bifurcation of the left common iliac artery.  On the right side I then completed deployment of the graft and recaptured the delivery system.  This is removed and a 16 Gibraltarian sheath placed in the arteriotomy over the wire.  Again a pigtail was placed over the wire and contrast angiography was performed to donya the iliac artery bifurcation.  Appropriately sized graft was then selected and advanced through the sheath and deployed to the common iliac bifurcation.  A Coda  balloon was then used to post dilate the graft at the neck and and each graft limb.  At this point an aptus delivery catheter was advanced over the wire to the neck.  Angulation of the C-arm was performed take all parallel exit of the graft.  3 o'clock and 9:00 o'clock positioning of the aptus sheath was then used to deploy endo anchors.  The C-arm was then rolled off 30° Liberian and again 3:00 o'clock and 9:00 o'clock deployment of endo anchors was performed.  I then brought the C-arm back to 30° MCNEILL and repeated the process at 3:00 o'clock and 9:00 o'clock.  At this point a pigtail catheter was advanced back up the left groin and angiography was performed.  There was some late filling likely due to heparinization, but no significant endoleak and the graft appeared patent.  At this point I turned my attention to the right groin.  The sheath was removed and the pursestring brought down and cinched down.  However there was still significant amount of calcific disease in the common femoral.  I clamped proximally and distally on the common femoral artery and performed an endarterectomy.  A longitudinal arteriotomy was made and endarterectomy performed of the common femoral.  Bovine pericardial patch was sewn in place with a 6 0 Prolene suture.  Flow was restored of the leg.  Attention was then turned to the left groin.  A 16 Greenlandic sheath was removed and the pursestring was able to cinch down the femoral artery.  This artery did not have as much disease and was adequately treated with a single pursestring suture.  The wounds were then made hemostatic and the protamine reversed.  We verified good Doppler signals in the feet.  The wounds were then closed in layers using 3-0 Vicryl for 2 deep layers and 4-0 Monocryl subcuticular on the skin.  Dermabond was placed.  The patient was awakened brought to recovery in stable condition.  He tolerated the procedure well.    Condition: Good

## 2024-11-05 NOTE — INTERVAL H&P NOTE
The patient has been examined and the H&P has been reviewed:    I concur with the findings and no changes have occurred since H&P was written.    Surgery risks, benefits and alternative options discussed and understood by patient/family.    Plan for EVAR with endo anchors.  We will need open exposure of both common femorals and possible intervention on the external iliacs to aid in graft deployment.  This was discussed with the patient.  He has had recent coronary intervention and revascularization.  He is on aspirin and Plavix and will remain so in the perioperative period.

## 2024-11-05 NOTE — TRANSFER OF CARE
"Anesthesia Transfer of Care Note    Patient: Heriberto Qiu    Procedure(s) Performed: Procedure(s) (LRB):  REPAIR-ANEURYSM-ABDOMINAL AORTIC-ENDOVASCULAR (AAA) (N/A)    Patient location: PACU    Anesthesia Type: general    Transport from OR: Transported from OR on 2-3 L/min O2 by NC with adequate spontaneous ventilation    Post pain: adequate analgesia    Post assessment: no apparent anesthetic complications    Post vital signs: stable    Level of consciousness: responds to stimulation and sedated    Nausea/Vomiting: no nausea/vomiting    Complications: none    Transfer of care protocol was followed    Last vitals: Visit Vitals  /70   Pulse (!) 39   Temp 36.6 °C (97.9 °F) (Oral)   Resp 12   Ht 5' 6" (1.676 m)   Wt 51.3 kg (113 lb)   SpO2 100%   BMI 18.24 kg/m²     "

## 2024-11-05 NOTE — ANESTHESIA PROCEDURE NOTES
Intubation    Date/Time: 11/5/2024 9:50 AM    Performed by: Manuel Nick CRNA  Authorized by: Juan Carlos Griffith DO    Intubation:     Induction:  Intravenous    Intubated:  Postinduction    Mask Ventilation:  Easy mask    Attempts:  1    Attempted By:  CRNA    Method of Intubation:  Direct    Blade:  Pierson 4    Laryngeal View Grade: Grade I - full view of cords      Difficult Airway Encountered?: No      Complications:  None    Airway Device:  Oral endotracheal tube    Airway Device Size:  8.0    Style/Cuff Inflation:  Cuffed (inflated to minimal occlusive pressure)    Inflation Amount (mL):  6    Tube secured:  22    Secured at:  The lips    Placement Verified By:  Capnometry    Complicating Factors:  None    Findings Post-Intubation:  BS equal bilateral

## 2024-11-05 NOTE — ANESTHESIA PREPROCEDURE EVALUATION
11/05/2024  Heriberto Qiu is a 64 y.o., male.      Pre-op Assessment    I have reviewed the Patient Summary Reports.     I have reviewed the Nursing Notes. I have reviewed the NPO Status.   I have reviewed the Medications.     Review of Systems  Anesthesia Hx:  No problems with previous Anesthesia                Social:  Former Smoker       Cardiovascular:     Hypertension  Past MI (2023) CAD    Dysrhythmias atrial fibrillation  CHF   PVD hyperlipidemia LARKIN   AAA - infrarenal  Ischemic cardiomyopathy        Shortness of Breath    Coronary Artery Disease:          Hx of Myocardial Infarction     Congestive Heart Failure (CHF)                Hypertension     Atrial Fibrillation     Pulmonary:   COPD, severe   Shortness of breath  Sleep apnea: 2.5 LPM ATC. O2 dependent   Chronic Obstructive Pulmonary Disease (COPD):                      Renal/:   Denies Chronic Renal Disease. no renal calculi               Hepatic/GI:      Denies GERD. Denies Liver Disease.  Denies Hepatitis.              Neurological:    Denies CVA.    Denies Seizures.                                Endocrine:  Denies Diabetes. Denies Hypothyroidism.  Denies Hyperthyroidism.       Denies Obesity / BMI > 30  Psych:   anxiety                 Physical Exam  General: Well nourished, Cooperative, Alert and Oriented    Airway:  Mallampati: I   Mouth Opening: Normal  TM Distance: Normal  Tongue: Normal  Neck ROM: Normal ROM    Dental:  Dentures, Periodontal disease        Anesthesia Plan  Type of Anesthesia, risks & benefits discussed:    Anesthesia Type: Gen ETT  Intra-op Monitoring Plan: Standard ASA Monitors and Art Line  Induction:  IV  Airway Plan: Video and Direct  Informed Consent: Informed consent signed with the Patient and all parties understand the risks and agree with anesthesia plan.  All questions answered. Patient consented to blood  products? Yes  ASA Score: 4  Day of Surgery Review of History & Physical: H&P Update referred to the surgeon/provider.    Ready For Surgery From Anesthesia Perspective.     .

## 2024-11-05 NOTE — ANESTHESIA PROCEDURE NOTES
Arterial    Diagnosis: AAA    Patient location during procedure: holding area  Timeout: 11/5/2024 8:45 AM  Procedure end time: 11/5/2024 8:50 AM    Staffing  Authorizing Provider: Juan Carlos Griffith DO  Performing Provider: Manuel Nick CRNA    Staffing  Performed by: Manuel Nick CRNA  Authorized by: Juan Carlos Griffith DO    Anesthesiologist was present at the time of the procedure.    Preanesthetic Checklist  Completed: patient identified, IV checked, site marked, risks and benefits discussed, surgical consent, monitors and equipment checked, pre-op evaluation, timeout performed and anesthesia consent givenArterial  Skin Prep: chlorhexidine gluconate  Local Infiltration: lidocaine  Orientation: right  Location: radial    Catheter Size: 20 G  Catheter placement by Ultrasound guidance. Heme positive aspiration all ports.   Vessel Caliber: small, patent, compressibility normal  Vascular Doppler:  not done  Needle advanced into vessel with real time Ultrasound guidance.  Guidewire confirmed in vessel.  Sterile sheath used.  Image recorded and saved.Insertion Attempts: 1  Assessment  Dressing: secured with tape and tegaderm  Patient: Tolerated well

## 2024-11-06 LAB
ANION GAP SERPL CALC-SCNC: 9 MEQ/L
BASOPHILS # BLD AUTO: 0.04 X10(3)/MCL
BASOPHILS NFR BLD AUTO: 0.4 %
BUN SERPL-MCNC: 66.2 MG/DL (ref 8.4–25.7)
CALCIUM SERPL-MCNC: 9.2 MG/DL (ref 8.8–10)
CHLORIDE SERPL-SCNC: 99 MMOL/L (ref 98–107)
CO2 SERPL-SCNC: 27 MMOL/L (ref 23–31)
CREAT SERPL-MCNC: 2.66 MG/DL (ref 0.72–1.25)
CREAT/UREA NIT SERPL: 25
EOSINOPHIL # BLD AUTO: 0 X10(3)/MCL (ref 0–0.9)
EOSINOPHIL NFR BLD AUTO: 0 %
ERYTHROCYTE [DISTWIDTH] IN BLOOD BY AUTOMATED COUNT: 15.3 % (ref 11.5–17)
GFR SERPLBLD CREATININE-BSD FMLA CKD-EPI: 26 ML/MIN/1.73/M2
GLUCOSE SERPL-MCNC: 123 MG/DL (ref 82–115)
HCT VFR BLD AUTO: 22.7 % (ref 42–52)
HGB BLD-MCNC: 7.7 G/DL (ref 14–18)
IMM GRANULOCYTES # BLD AUTO: 0.05 X10(3)/MCL (ref 0–0.04)
IMM GRANULOCYTES NFR BLD AUTO: 0.5 %
LYMPHOCYTES # BLD AUTO: 1.46 X10(3)/MCL (ref 0.6–4.6)
LYMPHOCYTES NFR BLD AUTO: 14.9 %
MCH RBC QN AUTO: 29.7 PG (ref 27–31)
MCHC RBC AUTO-ENTMCNC: 33.9 G/DL (ref 33–36)
MCV RBC AUTO: 87.6 FL (ref 80–94)
MONOCYTES # BLD AUTO: 0.81 X10(3)/MCL (ref 0.1–1.3)
MONOCYTES NFR BLD AUTO: 8.3 %
NEUTROPHILS # BLD AUTO: 7.43 X10(3)/MCL (ref 2.1–9.2)
NEUTROPHILS NFR BLD AUTO: 75.9 %
NRBC BLD AUTO-RTO: 0 %
OHS QRS DURATION: 110 MS
OHS QTC CALCULATION: 435 MS
PLATELET # BLD AUTO: 155 X10(3)/MCL (ref 130–400)
PMV BLD AUTO: 10 FL (ref 7.4–10.4)
POTASSIUM SERPL-SCNC: 4 MMOL/L (ref 3.5–5.1)
RBC # BLD AUTO: 2.59 X10(6)/MCL (ref 4.7–6.1)
SODIUM SERPL-SCNC: 135 MMOL/L (ref 136–145)
WBC # BLD AUTO: 9.79 X10(3)/MCL (ref 4.5–11.5)

## 2024-11-06 PROCEDURE — 27000221 HC OXYGEN, UP TO 24 HOURS

## 2024-11-06 PROCEDURE — 94640 AIRWAY INHALATION TREATMENT: CPT

## 2024-11-06 PROCEDURE — 25000003 PHARM REV CODE 250: Performed by: PHYSICIAN ASSISTANT

## 2024-11-06 PROCEDURE — 94760 N-INVAS EAR/PLS OXIMETRY 1: CPT

## 2024-11-06 PROCEDURE — 99900035 HC TECH TIME PER 15 MIN (STAT)

## 2024-11-06 PROCEDURE — 25000003 PHARM REV CODE 250: Performed by: SURGERY

## 2024-11-06 PROCEDURE — 21400001 HC TELEMETRY ROOM

## 2024-11-06 PROCEDURE — 85025 COMPLETE CBC W/AUTO DIFF WBC: CPT | Performed by: PHYSICIAN ASSISTANT

## 2024-11-06 PROCEDURE — 36415 COLL VENOUS BLD VENIPUNCTURE: CPT | Performed by: PHYSICIAN ASSISTANT

## 2024-11-06 PROCEDURE — 25000242 PHARM REV CODE 250 ALT 637 W/ HCPCS: Performed by: PHYSICIAN ASSISTANT

## 2024-11-06 PROCEDURE — 80048 BASIC METABOLIC PNL TOTAL CA: CPT | Performed by: PHYSICIAN ASSISTANT

## 2024-11-06 PROCEDURE — 63600175 PHARM REV CODE 636 W HCPCS: Performed by: SURGERY

## 2024-11-06 PROCEDURE — 97162 PT EVAL MOD COMPLEX 30 MIN: CPT

## 2024-11-06 PROCEDURE — 99900031 HC PATIENT EDUCATION (STAT)

## 2024-11-06 RX ORDER — CLOPIDOGREL BISULFATE 75 MG/1
75 TABLET ORAL DAILY
Status: DISCONTINUED | OUTPATIENT
Start: 2024-11-06 | End: 2024-11-10 | Stop reason: HOSPADM

## 2024-11-06 RX ORDER — IPRATROPIUM BROMIDE AND ALBUTEROL SULFATE 2.5; .5 MG/3ML; MG/3ML
3 SOLUTION RESPIRATORY (INHALATION) EVERY 6 HOURS PRN
Status: DISCONTINUED | OUTPATIENT
Start: 2024-11-06 | End: 2024-11-10 | Stop reason: HOSPADM

## 2024-11-06 RX ORDER — NAPROXEN SODIUM 220 MG/1
81 TABLET, FILM COATED ORAL DAILY
Status: DISCONTINUED | OUTPATIENT
Start: 2024-11-06 | End: 2024-11-10 | Stop reason: HOSPADM

## 2024-11-06 RX ORDER — TALC
6 POWDER (GRAM) TOPICAL NIGHTLY PRN
Status: DISCONTINUED | OUTPATIENT
Start: 2024-11-06 | End: 2024-11-10 | Stop reason: HOSPADM

## 2024-11-06 RX ORDER — ATORVASTATIN CALCIUM 40 MG/1
40 TABLET, FILM COATED ORAL DAILY
Status: DISCONTINUED | OUTPATIENT
Start: 2024-11-06 | End: 2024-11-10 | Stop reason: HOSPADM

## 2024-11-06 RX ADMIN — CARVEDILOL 3.12 MG: 3.12 TABLET, FILM COATED ORAL at 09:11

## 2024-11-06 RX ADMIN — ATORVASTATIN CALCIUM 40 MG: 40 TABLET, FILM COATED ORAL at 09:11

## 2024-11-06 RX ADMIN — MUPIROCIN: 20 OINTMENT TOPICAL at 09:11

## 2024-11-06 RX ADMIN — ASPIRIN 81 MG CHEWABLE TABLET 81 MG: 81 TABLET CHEWABLE at 09:11

## 2024-11-06 RX ADMIN — ONDANSETRON 4 MG: 2 INJECTION INTRAMUSCULAR; INTRAVENOUS at 12:11

## 2024-11-06 RX ADMIN — CARVEDILOL 3.12 MG: 3.12 TABLET, FILM COATED ORAL at 05:11

## 2024-11-06 RX ADMIN — SODIUM CHLORIDE 500 ML: 9 INJECTION, SOLUTION INTRAVENOUS at 01:11

## 2024-11-06 RX ADMIN — HYDROCODONE BITARTRATE AND ACETAMINOPHEN 1 TABLET: 5; 325 TABLET ORAL at 06:11

## 2024-11-06 RX ADMIN — HYDROCODONE BITARTRATE AND ACETAMINOPHEN 1 TABLET: 5; 325 TABLET ORAL at 05:11

## 2024-11-06 RX ADMIN — CLOPIDOGREL BISULFATE 75 MG: 75 TABLET ORAL at 09:11

## 2024-11-06 RX ADMIN — ONDANSETRON 4 MG: 2 INJECTION INTRAMUSCULAR; INTRAVENOUS at 02:11

## 2024-11-06 RX ADMIN — IPRATROPIUM BROMIDE AND ALBUTEROL SULFATE 3 ML: .5; 3 SOLUTION RESPIRATORY (INHALATION) at 05:11

## 2024-11-06 RX ADMIN — VANCOMYCIN HYDROCHLORIDE 1000 MG: 1 INJECTION, POWDER, LYOPHILIZED, FOR SOLUTION INTRAVENOUS at 02:11

## 2024-11-06 NOTE — PROGRESS NOTES
Vascular Surgery - Progress Note  Aldo Knott PA-C    Patient Name: Heriberto Qiu                   : 1960     MRN: [id number]   Date of Admission: 2024  Date of Exam: 2024     Subjective:     Post-Op Information:  REPAIR-ANEURYSM-ABDOMINAL AORTIC-ENDOVASCULAR (AAA) (N/A)  1 Day Post-Op    Interval History: Vitals stable, afebrile. Per nurse, had some bleeding from the left groin onset this morning; currently on 3rd dressing. No active bleeding at this time. Patient notes soreness to both groins and his abdomen. Borges taken out this morning; urinating okay on his own. Has not gotten out of bed yet today. Is c/o some nausea.     Objective     Vital Signs (Most Recent):      Temp: 97.9 °F (36.6 °C) (24)  Pulse: 68 (24)  Resp: 18 (24)  BP: 100/64 (24)  SpO2: 98 % (24) Vital Signs (24h Range):    Temp:  [96.9 °F (36.1 °C)-97.9 °F (36.6 °C)] 97.9 °F (36.6 °C)  Pulse:  [39-68] 68  Resp:  [11-18] 18  SpO2:  [98 %-100 %] 98 %  BP: (100-167)/(64-93) 100/64        Intake/Output - Last 3 Shifts             IV Piggyback  1200     Total Intake(mL/kg)  1200 (23.4)     Urine (mL/kg/hr)  700 (0.6)     Total Output  700     Net  +500                  CMP:   Recent Labs   Lab 24  2241   CALCIUM 9.5   ALBUMIN 3.3*      K 4.1   CO2 22*      BUN 68.8*   CREATININE 2.91*   ALKPHOS 70   ALT 6   AST 15   BILITOT 0.4       Significant Diagnostics:      CTA ABD AORTA BILAT RUNOFF VESSELS W WO (10/3/2024)    Impression  Infrarenal abdominal aortic aneurysm measuring 6 cm in diameter with prominent mural-based thrombus and focal soft tissue prominence along the left anterolateral aspect of the lower aneurysm sac which was not definitely seen on the prior study.     Extensive atherosclerotic disease is noted in the iliac and femoral arteries with no significant stenoses in the  common iliac arteries but scattered moderate stenoses throughout both external iliac arteries. There is prominent atherosclerotic plaque in the common femoral arteries with areas of high-grade stenosis in the left and right common femoral arteries. The femoral artery plaque is mostly posterior.     Extensive atherosclerotic disease is noted throughout the right superficial femoral artery popliteal artery with a three-vessel runoff on the right though the runoff vessels appear diminutive with atherosclerotic calcifications throughout the right MICKI.     There is complete occlusion of the left SFA from just past its origin with reconstitution at the level of the mid popliteal artery via a prominent collateral from the compensatorily enlarged profundus artery. A normal three-vessel runoff is noted on the left.     Physical Exam:     Constitutional: Awake, alert, laying in bed, pallor noted  Cardiovascular: Regular rate, rhythm  Respiratory: Normal respiratory effort  Musculoskeletal: ROM noted in all extremities    Neurologic: Strength and sensation are equal bilaterally  Skin: Incisions to bilateral groins are C/D/I with overlying dermabond; no hematoma present. Left groin does have dressing in place with small amount of blood however no wound dehiscence noted       Assessment and Plan     Mr. Qiu is a 64 y.o. male with AAA s/p EVAR with right CFA endarterectomy and lithotripsy of bilateral iliac arteries. C/o some soreness and nausea.    - Antiemetics ordered  - CBC/BMP ordered to evaluate for anemia as well as kidney function   - PT ordered to help patient with mobility/ambulation   - RN to monitor left groin for signs of further bleeding     The above findings, diagnostics, and treatment plan were discussed with Dr. Platt who is in agreement with the plan of care except as stated in additional documentation.      Aldo Knott PA-C  Vascular Surgery  [facility]    Active Diagnoses:     There  are no hospital problems to display for this patient.        Medications:     Continuous Infusion:    0.9% NaCl   Intravenous Continuous           Scheduled Medications:    aspirin  81 mg Oral Daily    atorvastatin  40 mg Oral Daily    carvediloL  3.125 mg Oral BID WM    clopidogreL  75 mg Oral Daily    mupirocin   Nasal BID    vancomycin (VANCOCIN) IV (PEDS and ADULTS)  1,000 mg Intravenous Q12H       PRN Medications:   Current Facility-Administered Medications:     dextrose 10%, 12.5 g, Intravenous, PRN    dextrose 10%, 25 g, Intravenous, PRN    HYDROcodone-acetaminophen, 1 tablet, Oral, Q4H PRN    ondansetron, 4 mg, Intravenous, Q6H PRN

## 2024-11-06 NOTE — PLAN OF CARE
Problem: Physical Therapy  Goal: Physical Therapy Goal  Description: Goals to be met by: 24     Patient will increase functional independence with mobility by performin. Supine to sit with [place]  2. Sit to supine with [place]  3. Sit to stand transfer with [place]   4. Gait  x 300 feet with [place] using LRAD    Outcome: Progressing

## 2024-11-06 NOTE — PT/OT/SLP EVAL
"Physical Therapy Evaluation    Patient Name:  Heriberto Qiu   MRN:  [id number]    Recommendations:     Discharge therapy intensity: Low Intensity Therapy   Discharge Equipment Recommendations: walker, rolling   Barriers to discharge: Impaired mobility and Ongoing medical needs    Assessment:     Heriberto Qiu is a 64 y.o. male admitted with a medical diagnosis of AAA s/p EVAR with R CFA endarterectomy and lithotripsy of B iliac arteries.  He presents with the following impairments/functional limitations: impaired endurance, impaired self care skills, impaired functional mobility, gait instability, impaired balance, pain.    Pt with fairly good tolerance to PT eval. He is AAOx4. Pt is SBA for bed mobility, CGA for sit to stand and ambulation x 50 feet with RW. Pt requires max encouragement to participate in functional mobility, but agreeable. At baseline, pt is independent. Pt is appropriate for low intensity therapy upon d/c.     Rehab Prognosis: Good; patient would benefit from acute skilled PT services to address these deficits and reach maximum level of function.    Recent Surgery: Procedure(s) (LRB):  REPAIR-ANEURYSM-ABDOMINAL AORTIC-ENDOVASCULAR (AAA) (N/A) 1 Day Post-Op    Plan:     During this hospitalization, patient would benefit from acute PT services 5 x/week to address the identified rehab impairments via gait training, therapeutic activities, therapeutic exercises and progress toward the following goals:    Plan of Care Expires:  12/06/24    Subjective     Chief Complaint: "I'm cold"  Patient/Family Comments/goals: none stated  Pain/Comfort:  Pain Rating 1: 0/10    Patients cultural, spiritual, Pentec[employer]alism conflicts given the current situation: no    Living Environment:  Pt lives alone in [facility] with no steps to enter.   Prior to admission, patients level of function was independent.  Equipment used at home: none.  DME owned (not currently used): none.  Upon discharge, patient will have assistance from no " one.    Objective:     Communicated with RN prior to session.  Patient found HOB elevated with telemetry, pulse ox (continuous), peripheral IV  upon PT entry to room.    General Precautions: Standard, fall  Orthopedic Precautions:N/A   Braces: N/A  Respiratory Status: Nasal cannula, flow 2 L/min; SpO2 % t/o eval      Exams:  Cognitive Exam:  Patient is oriented to Person, Place, Time, and Situation  BLE ROM: WFL  BLE Strength: grossly 5/5  Skin integrity: Visible skin intact      Functional Mobility:  Bed Mobility:     Supine to Sit: stand by assistance  Transfers:     Sit to Stand:  contact guard assistance with no AD  Toilet Transfer: contact guard assistance with  no AD  using  Step Transfer; slightly unsteady without AD  Gait: 50 feet with CGA and RW; pt demos forward flexed posture and step-through pattern; slow annette  Balance: SBA for static sitting balance; SBA for static standing balance with RW      AM-PAC 6 CLICK MOBILITY  Total Score:21       Treatment & Education:  Patient provided with verbal education education regarding PT role/goals/POC, fall prevention, safety awareness, and discharge/DME recommendations.  Understanding was verbalized.     Patient left up in chair with all lines intact, call button in reach, and RN notified.    GOALS:   Multidisciplinary Problems       Physical Therapy Goals          Problem: Physical Therapy    Goal Priority Disciplines Outcome Interventions   Physical Therapy Goal     PT, PT/OT Progressing    Description: Goals to be met by: 24     Patient will increase functional independence with mobility by performin. Supine to sit with [place]  2. Sit to supine with [place]  3. Sit to stand transfer with [place]   4. Gait  x 300 feet with [place] using LRAD                         History:     Past Medical History:   Diagnosis Date    AAA (abdominal aortic aneurysm)     Anxiety     CAD (coronary artery disease)     CHF (congestive heart  failure)     COPD (chronic obstructive pulmonary disease)     Digestive disorder     GERD    Heart attack 2023    HTN (hypertension)     Infrarenal abdominal aortic aneurysm (AAA) without rupture     Insomnia     Ischemic cardiomyopathy 10/04/2024    Oxygen dependent     2-3L/nasal cannula continuous    Paroxysmal atrial fibrillation     PVD (peripheral vascular disease)     SOB (shortness of breath) on exertion        Past Surgical History:   Procedure Laterality Date    ABDOMINAL AORTIC ANEURYSM REPAIR, ENDOVASCULAR N/A 11/5/2024    Procedure: REPAIR-ANEURYSM-ABDOMINAL AORTIC-ENDOVASCULAR (AAA);  Surgeon: Gerhard Platt MD;  [facility];  Service: Peripheral Vascular;  Laterality: N/A;  OR 12, vascular table    COLON RESECTION  2014    COLONOSCOPY      LEFT HEART CATHETERIZATION      SMALL INTESTINE SURGERY         Time Tracking:     PT Received On: 11/06/24  PT Start Time: 1417     PT Stop Time: 1434  PT Total Time (min): 17 min     Billable Minutes: Evaluation mod      11/06/2024

## 2024-11-07 LAB
ANION GAP SERPL CALC-SCNC: 8 MEQ/L
BASOPHILS # BLD AUTO: 0.04 X10(3)/MCL
BASOPHILS NFR BLD AUTO: 0.5 %
BUN SERPL-MCNC: 55.5 MG/DL (ref 8.4–25.7)
CALCIUM SERPL-MCNC: 9.2 MG/DL (ref 8.8–10)
CHLORIDE SERPL-SCNC: 103 MMOL/L (ref 98–107)
CO2 SERPL-SCNC: 25 MMOL/L (ref 23–31)
CREAT SERPL-MCNC: 2.06 MG/DL (ref 0.72–1.25)
CREAT/UREA NIT SERPL: 27
EOSINOPHIL # BLD AUTO: 0.02 X10(3)/MCL (ref 0–0.9)
EOSINOPHIL NFR BLD AUTO: 0.3 %
ERYTHROCYTE [DISTWIDTH] IN BLOOD BY AUTOMATED COUNT: 15.5 % (ref 11.5–17)
GFR SERPLBLD CREATININE-BSD FMLA CKD-EPI: 35 ML/MIN/1.73/M2
GLUCOSE SERPL-MCNC: 108 MG/DL (ref 82–115)
HCT VFR BLD AUTO: 21.1 % (ref 42–52)
HGB BLD-MCNC: 7.1 G/DL (ref 14–18)
IMM GRANULOCYTES # BLD AUTO: 0.03 X10(3)/MCL (ref 0–0.04)
IMM GRANULOCYTES NFR BLD AUTO: 0.4 %
LIPASE SERPL-CCNC: 7 U/L
LYMPHOCYTES # BLD AUTO: 1.64 X10(3)/MCL (ref 0.6–4.6)
LYMPHOCYTES NFR BLD AUTO: 20.5 %
MCH RBC QN AUTO: 29.8 PG (ref 27–31)
MCHC RBC AUTO-ENTMCNC: 33.6 G/DL (ref 33–36)
MCV RBC AUTO: 88.7 FL (ref 80–94)
MONOCYTES # BLD AUTO: 0.79 X10(3)/MCL (ref 0.1–1.3)
MONOCYTES NFR BLD AUTO: 9.9 %
NEUTROPHILS # BLD AUTO: 5.47 X10(3)/MCL (ref 2.1–9.2)
NEUTROPHILS NFR BLD AUTO: 68.4 %
NRBC BLD AUTO-RTO: 0 %
OHS QRS DURATION: 106 MS
OHS QTC CALCULATION: 388 MS
PLATELET # BLD AUTO: 148 X10(3)/MCL (ref 130–400)
PMV BLD AUTO: 10.4 FL (ref 7.4–10.4)
POTASSIUM SERPL-SCNC: 3.9 MMOL/L (ref 3.5–5.1)
RBC # BLD AUTO: 2.38 X10(6)/MCL (ref 4.7–6.1)
SODIUM SERPL-SCNC: 136 MMOL/L (ref 136–145)
TROPONIN I SERPL-MCNC: 0.06 NG/ML (ref 0–0.04)
TROPONIN I SERPL-MCNC: 0.08 NG/ML (ref 0–0.04)
WBC # BLD AUTO: 7.99 X10(3)/MCL (ref 4.5–11.5)

## 2024-11-07 PROCEDURE — P9016 RBC LEUKOCYTES REDUCED: HCPCS | Performed by: SURGERY

## 2024-11-07 PROCEDURE — 93010 ELECTROCARDIOGRAM REPORT: CPT | Mod: ,,, | Performed by: INTERNAL MEDICINE

## 2024-11-07 PROCEDURE — 80048 BASIC METABOLIC PNL TOTAL CA: CPT | Performed by: PHYSICIAN ASSISTANT

## 2024-11-07 PROCEDURE — 25000003 PHARM REV CODE 250: Performed by: PHYSICIAN ASSISTANT

## 2024-11-07 PROCEDURE — 99900035 HC TECH TIME PER 15 MIN (STAT)

## 2024-11-07 PROCEDURE — 30233N1 TRANSFUSION OF NONAUTOLOGOUS RED BLOOD CELLS INTO PERIPHERAL VEIN, PERCUTANEOUS APPROACH: ICD-10-PCS | Performed by: SPECIALIST

## 2024-11-07 PROCEDURE — 21400001 HC TELEMETRY ROOM

## 2024-11-07 PROCEDURE — 27000221 HC OXYGEN, UP TO 24 HOURS

## 2024-11-07 PROCEDURE — 83690 ASSAY OF LIPASE: CPT | Performed by: PHYSICIAN ASSISTANT

## 2024-11-07 PROCEDURE — 93005 ELECTROCARDIOGRAM TRACING: CPT

## 2024-11-07 PROCEDURE — 84484 ASSAY OF TROPONIN QUANT: CPT | Performed by: PHYSICIAN ASSISTANT

## 2024-11-07 PROCEDURE — 63600175 PHARM REV CODE 636 W HCPCS: Performed by: PHYSICIAN ASSISTANT

## 2024-11-07 PROCEDURE — 25000003 PHARM REV CODE 250: Performed by: SURGERY

## 2024-11-07 PROCEDURE — 36415 COLL VENOUS BLD VENIPUNCTURE: CPT | Performed by: PHYSICIAN ASSISTANT

## 2024-11-07 PROCEDURE — 25500020 PHARM REV CODE 255

## 2024-11-07 PROCEDURE — 85025 COMPLETE CBC W/AUTO DIFF WBC: CPT | Performed by: PHYSICIAN ASSISTANT

## 2024-11-07 PROCEDURE — 63600175 PHARM REV CODE 636 W HCPCS: Performed by: SURGERY

## 2024-11-07 RX ORDER — ALUMINUM HYDROXIDE, MAGNESIUM HYDROXIDE, AND SIMETHICONE 1200; 120; 1200 MG/30ML; MG/30ML; MG/30ML
30 SUSPENSION ORAL
Status: DISCONTINUED | OUTPATIENT
Start: 2024-11-07 | End: 2024-11-10 | Stop reason: HOSPADM

## 2024-11-07 RX ORDER — PROMETHAZINE HYDROCHLORIDE 25 MG/ML
25 INJECTION, SOLUTION INTRAMUSCULAR; INTRAVENOUS EVERY 8 HOURS PRN
Status: DISCONTINUED | OUTPATIENT
Start: 2024-11-07 | End: 2024-11-10 | Stop reason: HOSPADM

## 2024-11-07 RX ORDER — HYDROCODONE BITARTRATE AND ACETAMINOPHEN 500; 5 MG/1; MG/1
TABLET ORAL
Status: DISCONTINUED | OUTPATIENT
Start: 2024-11-07 | End: 2024-11-10 | Stop reason: HOSPADM

## 2024-11-07 RX ADMIN — ALUMINUM HYDROXIDE, MAGNESIUM HYDROXIDE, AND SIMETHICONE 30 ML: 1200; 120; 1200 SUSPENSION ORAL at 04:11

## 2024-11-07 RX ADMIN — CARVEDILOL 3.12 MG: 3.12 TABLET, FILM COATED ORAL at 04:11

## 2024-11-07 RX ADMIN — ONDANSETRON 4 MG: 2 INJECTION INTRAMUSCULAR; INTRAVENOUS at 11:11

## 2024-11-07 RX ADMIN — MUPIROCIN: 20 OINTMENT TOPICAL at 11:11

## 2024-11-07 RX ADMIN — MUPIROCIN: 20 OINTMENT TOPICAL at 07:11

## 2024-11-07 RX ADMIN — HYDROCODONE BITARTRATE AND ACETAMINOPHEN 1 TABLET: 5; 325 TABLET ORAL at 01:11

## 2024-11-07 RX ADMIN — PROMETHAZINE HYDROCHLORIDE 25 MG: 25 INJECTION INTRAMUSCULAR; INTRAVENOUS at 06:11

## 2024-11-07 RX ADMIN — ONDANSETRON 4 MG: 2 INJECTION INTRAMUSCULAR; INTRAVENOUS at 07:11

## 2024-11-07 RX ADMIN — ALUMINUM HYDROXIDE, MAGNESIUM HYDROXIDE, AND SIMETHICONE 30 ML: 1200; 120; 1200 SUSPENSION ORAL at 07:11

## 2024-11-07 RX ADMIN — ALUMINUM HYDROXIDE, MAGNESIUM HYDROXIDE, AND SIMETHICONE 30 ML: 1200; 120; 1200 SUSPENSION ORAL at 11:11

## 2024-11-07 RX ADMIN — CLOPIDOGREL BISULFATE 75 MG: 75 TABLET ORAL at 11:11

## 2024-11-07 RX ADMIN — ATORVASTATIN CALCIUM 40 MG: 40 TABLET, FILM COATED ORAL at 11:11

## 2024-11-07 RX ADMIN — HYDROCODONE BITARTRATE AND ACETAMINOPHEN 1 TABLET: 5; 325 TABLET ORAL at 11:11

## 2024-11-07 RX ADMIN — ASPIRIN 81 MG CHEWABLE TABLET 81 MG: 81 TABLET CHEWABLE at 11:11

## 2024-11-07 RX ADMIN — HYDROCODONE BITARTRATE AND ACETAMINOPHEN 1 TABLET: 5; 325 TABLET ORAL at 07:11

## 2024-11-07 RX ADMIN — HYDROCODONE BITARTRATE AND ACETAMINOPHEN 1 TABLET: 5; 325 TABLET ORAL at 06:11

## 2024-11-07 RX ADMIN — PROMETHAZINE HYDROCHLORIDE 25 MG: 25 INJECTION INTRAMUSCULAR; INTRAVENOUS at 11:11

## 2024-11-07 RX ADMIN — ONDANSETRON 4 MG: 2 INJECTION INTRAMUSCULAR; INTRAVENOUS at 03:11

## 2024-11-07 RX ADMIN — CARVEDILOL 3.12 MG: 3.12 TABLET, FILM COATED ORAL at 11:11

## 2024-11-07 RX ADMIN — ONDANSETRON 4 MG: 2 INJECTION INTRAMUSCULAR; INTRAVENOUS at 10:11

## 2024-11-07 RX ADMIN — PERFLUTREN 1 ML: 6.52 INJECTION, SUSPENSION INTRAVENOUS at 06:11

## 2024-11-07 NOTE — CONSULTS
Inpatient consult to Cardiology  Consult performed by: Josefa Jay FNP  Consult ordered by: Aldo Knott PA-C  Reason for consult: NSTEMI        [facility] - 6th Floor Medical Telemetry    Cardiology  Consult Note    Patient Name: Heriberto Qiu  MRN: [id number]  Admission Date: 11/5/2024  Hospital Length of Stay: 2 days  Code Status: No Order   Attending Provider: Gerhard Platt MD   Consulting Provider: ELIJAH Locke  Primary Care Physician: Juan Carlos Oshea MD  Principal Problem:<principal problem not specified>    Patient information was obtained from patient, past medical records, and ER records.     Subjective:     Reason for Consult: NSTEMI    HPI: Mr. Qiu is a 64 year old male who is unknown to [facility]. He presents to [facility] on 11.5.24 for a scheduled EVAR w/ Right CFA Endarterectomy and Lithotripsy of Bilateral Iliac Arteries. He did well in the post-operative period. He did have some bleeding from his left groin site. On 11.7.24, he endorsed some epigastric discomfort but denies CP, SOB, or palps. Of note, he underwent LHC on 10.22.24 and had PCI of his LAD. Significant labs include H&H 7.1/21.1, B/Cr 55.5, & trop 0.077. He denies CP on examination. [facility] has been consulted to further evaluate the patient's elevated troponin.       PMH: AAA, CAD, CHF, COPD, HTN, ICMO,  PVD  PSH: Small Intestine Surgery, EVAR  Family History: Mother - Heart Disease; Father - Heart Attack   Social History: Former tobacco use. Denies alcohol or illicit drug use.     Previous Cardiac Diagnostics:   LHC (10.22.24):  FINDINGS:   LEFT MAIN: Ostial left main has 30% stenosis.  FFR of left main was   performed which was negative at 0.96.  On IVUS MLA was more than 9 mm care   there is diffuse plaque in the left main   LEFT ANTERIOR DESCENDING ARTERY: Proximal LAD has 90% stenosis.  Distally   vessel is small and has mild diffuse disease supplies collateral to RCA   LEFT CIRCUMFLEX: Small vessel.   Completely occluding distal segment.    Proximal and mid segment has severe diffuse disease   RIGHT CORONARY ARTERY: Not injected.  Known occluded from before   LVEDP: 15   No gradient  between LV-AO on pull back   PROCEDURE PERFORMED:   Left heart cath   DFR and FFR of left main   IVUS of left main   Successful IVUS guided PCI of LAD       Review of patient's allergies indicates:   Allergen Reactions    Cefadroxil Hives     No current facility-administered medications on file prior to encounter.     Current Outpatient Medications on File Prior to Encounter   Medication Sig    ALPRAZolam (XANAX) 0.5 MG tablet Take 1 tablet by mouth nightly as needed.    aspirin 81 MG Chew Take 1 tablet by mouth every morning.    atorvastatin (LIPITOR) 40 MG tablet Take 1 tablet by mouth every morning.    BREZTRI AEROSPHERE 160-9-4.8 mcg/actuation HFAA Inhale 1 puff into the lungs Daily.    carvediloL (COREG) 3.125 MG tablet Take 3.125 mg by mouth 2 (two) times daily with meals.    clopidogreL (PLAVIX) 75 mg tablet Take 1 tablet by mouth every morning.    COMBIVENT RESPIMAT  mcg/actuation inhaler Inhale 1 puff into the lungs 4 (four) times daily.    lisinopriL (PRINIVIL,ZESTRIL) 5 MG tablet Take 1 tablet by mouth every morning.    pantoprazole (PROTONIX) 40 MG tablet Take 1 tablet by mouth every morning.    torsemide (DEMADEX) 10 MG Tab Take 1 tablet by mouth every morning.    temazepam (RESTORIL) 30 mg capsule Take 1 capsule by mouth nightly as needed.       Review of Systems   Respiratory:  Negative for shortness of breath.    Cardiovascular:  Negative for chest pain and palpitations.   Musculoskeletal:  Positive for back pain.   Skin:         Groin pain        Objective:     Vital Signs (Most Recent):  Temp: 98.4 °F (36.9 °C) (11/07/24 1516)  Pulse: 79 (11/07/24 1516)  Resp: 18 (11/07/24 1322)  BP: (!) 159/90 (11/07/24 1516)  SpO2: 100 % (11/07/24 1516) Vital Signs (24h Range):  Temp:  [97.9 °F (36.6 °C)-98.4 °F (36.9 °C)]  "98.4 °F (36.9 °C)  Pulse:  [55-94] 79  Resp:  [17-20] 18  SpO2:  [97 %-100 %] 100 %  BP: ()/(60-95) 159/90   Weight: 51.3 kg (113 lb)  Body mass index is 18.24 kg/m².  SpO2: 100 %       Intake/Output Summary (Last 24 hours) at 11/7/2024 1558  Last data filed at 11/7/2024 1400  Gross per 24 hour   Intake 640 ml   Output 600 ml   Net 40 ml     Lines/Drains/Airways       Peripheral Intravenous Line  Duration                  Peripheral IV - Single Lumen 11/07/24 1511 20 G Posterior;Right Forearm <1 day                  Significant Labs:   Chemistries:   Recent Labs   Lab 11/05/24  2241 11/06/24  1151 11/07/24  0501 11/07/24  1103    135* 136  --    K 4.1 4.0 3.9  --     99 103  --    CO2 22* 27 25  --    BUN 68.8* 66.2* 55.5*  --    CREATININE 2.91* 2.66* 2.06*  --    CALCIUM 9.5 9.2 9.2  --    BILITOT 0.4  --   --   --    ALKPHOS 70  --   --   --    ALT 6  --   --   --    AST 15  --   --   --    GLUCOSE 147* 123* 108  --    MG 2.40  --   --   --    TROPONINI  --   --   --  0.077*        CBC/Anemia Labs: Coags:    Recent Labs   Lab 11/06/24  1049 11/07/24  0501   WBC 9.79 7.99   HGB 7.7* 7.1*   HCT 22.7* 21.1*    148   MCV 87.6 88.7   RDW 15.3 15.5    No results for input(s): "PT", "INR", "APTT" in the last 168 hours.     Significant Imaging:    EKG:       Telemetry:  SR    Physical Exam  HENT:      Head: Normocephalic.      Nose: Nose normal.      Mouth/Throat:      Mouth: Mucous membranes are moist.   Eyes:      Extraocular Movements: Extraocular movements intact.   Cardiovascular:      Rate and Rhythm: Normal rate and regular rhythm.      Pulses: Normal pulses.      Heart sounds: Normal heart sounds.   Pulmonary:      Effort: Pulmonary effort is normal.   Abdominal:      Palpations: Abdomen is soft.   Skin:     General: Skin is warm.      Comments: Bilateral groin sites - c/d/i   Neurological:      Mental Status: He is alert and oriented to person, place, and time.   Psychiatric:         " Behavior: Behavior normal.         Home Medications:   No current facility-administered medications on file prior to encounter.     Current Outpatient Medications on File Prior to Encounter   Medication Sig Dispense Refill    ALPRAZolam (XANAX) 0.5 MG tablet Take 1 tablet by mouth nightly as needed.      aspirin 81 MG Chew Take 1 tablet by mouth every morning.      atorvastatin (LIPITOR) 40 MG tablet Take 1 tablet by mouth every morning.      BREZTRI AEROSPHERE 160-9-4.8 mcg/actuation HFAA Inhale 1 puff into the lungs Daily.      carvediloL (COREG) 3.125 MG tablet Take 3.125 mg by mouth 2 (two) times daily with meals.      clopidogreL (PLAVIX) 75 mg tablet Take 1 tablet by mouth every morning.      COMBIVENT RESPIMAT  mcg/actuation inhaler Inhale 1 puff into the lungs 4 (four) times daily.      lisinopriL (PRINIVIL,ZESTRIL) 5 MG tablet Take 1 tablet by mouth every morning.      pantoprazole (PROTONIX) 40 MG tablet Take 1 tablet by mouth every morning.      torsemide (DEMADEX) 10 MG Tab Take 1 tablet by mouth every morning.      temazepam (RESTORIL) 30 mg capsule Take 1 capsule by mouth nightly as needed.       Current Schedule Inpatient Medications:   aluminum-magnesium hydroxide-simethicone  30 mL Oral QID (AC & HS)    aspirin  81 mg Oral Daily    atorvastatin  40 mg Oral Daily    carvediloL  3.125 mg Oral BID WM    clopidogreL  75 mg Oral Daily    mupirocin   Nasal BID     Continuous Infusions:   0.9% NaCl   Intravenous Continuous         Assessment:   NSTEMI - Suspect Type 2 Due to Anemia & FAISAL  AAA (6 cm)    - s/p EVAR w/ Right CFA Endarterectomy and Lithotripsy of Bilateral Iliac Arteries (11.5.24)  CAD    - [facility] (10.22.24): PCI of LAD  HTN  Anemia  FAISAL  PAD    Plan:   Trend troponin x 3.  Obtain echo.  Continue DAPT (ASA/Plavix) for recent cardiac stenting.  Low suspicion of ACS.  Will continue to follow.     Thank you for your consult.     Josefa Jay, ELIJAH  Cardiology  [facility] -  [facility] Floor Medical Telemetry  11/07/2024     I agree with the findings of the complexity of problems addressed and take responsibility for the plan's risks and complications. I approved the plan documented by Josefa Jay NP.

## 2024-11-07 NOTE — ANESTHESIA POSTPROCEDURE EVALUATION
Anesthesia Post Evaluation    Patient: Heriberto Qiu    Procedure(s) Performed: Procedure(s) (LRB):  REPAIR-ANEURYSM-ABDOMINAL AORTIC-ENDOVASCULAR (AAA) (N/A)    Final Anesthesia Type: general      Patient location during evaluation: PACU  Patient participation: Yes- Able to Participate  Level of consciousness: awake and alert  Post-procedure vital signs: reviewed and stable  Pain management: adequate  Airway patency: patent    PONV status at discharge: No PONV  Anesthetic complications: no      Cardiovascular status: blood pressure returned to baseline  Respiratory status: unassisted and spontaneous ventilation  Hydration status: euvolemic  Follow-up needed               Vitals Value Taken Time   /75 11/06/24 1739   Temp 36.7 °C (98 °F) 11/06/24 1552   Pulse 64 11/06/24 1731   Resp 18 11/06/24 1741   SpO2 100 % 11/06/24 1731         Event Time   Out of Recovery 15:18:00         Pain/Alejandra Score: Pain Rating Prior to Med Admin: 5 (11/6/2024  5:41 PM)  Pain Rating Post Med Admin: 0 (11/6/2024  7:11 AM)  Alejandra Score: 8 (11/5/2024  3:18 PM)

## 2024-11-07 NOTE — PLAN OF CARE
11/07/24 1043   Discharge Assessment   Assessment Type Discharge Planning Assessment   Confirmed/corrected address, phone number and insurance Yes   Source of Information patient   When was your last doctors appointment?   (PCP is Dr. Juan Carlos Oshea. Patient reports last PCP appointment was 1 month ago.)   Reason For Admission AAA S/P EVAR   People in Home alone   Do you expect to return to your current living situation? Yes   Do you have help at home or someone to help you manage your care at home? Yes   Who are your caregiver(s) and their phone number(s)? Malik/son/[phone]   Current cognitive status: Alert/Oriented   Walking or Climbing Stairs Difficulty no   Dressing/Bathing Difficulty no   Home Accessibility wheelchair accessible   Home Layout Able to live on 1st floor   Equipment Currently Used at Home oxygen;CPAP;walker, rolling;rollator   Readmission within 30 days? No   Do you currently have service(s) that help you manage your care at home? Yes   Name and Contact number of agency Doesn't remember the name of home health company   Is the pt/caregiver preference to resume services with current agency Yes   Do you take prescription medications? Yes   Do you have prescription coverage? Yes   Do you have any problems affording any of your prescribed medications? No   Who is going to help you get home at discharge? Friend   How do you get to doctors appointments? car, drives self   Are you on dialysis? No   Discharge Plan A Home Health   Discharge Plan B Home Health   Discharge Plan discussed with: Patient     Friend will assist during recovery. Patient currently has home health, but doesn't remember the name. He will find out home health name. PT recommended a RW. Patient states that he currently has a walker at home.

## 2024-11-07 NOTE — PT/OT/SLP PROGRESS
Physical Therapy      Patient Name:  Heriberto Qiu   MRN:  [id number]    Patient not seen today secondary to currently on 24 hour bedrest for groin drainage. Will f/u as schedule permits.

## 2024-11-07 NOTE — CARE UPDATE
This patient is established with [facility] in [place].  Phone [phone] and fax [phone]. Notified Kristine via secure chat.

## 2024-11-07 NOTE — PROGRESS NOTES
Vascular Surgery - Progress Note  Aldo Knott PA-C    Patient Name: Heriberto Qiu                   : 1960     MRN: [id number]   Date of Admission: 2024  Date of Exam: 2024     Subjective:     Post-Op Information:  REPAIR-ANEURYSM-ABDOMINAL AORTIC-ENDOVASCULAR (AAA) (N/A)  2 Days Post-Op    Interval History: No acute events over night. Vitals stable, remains afebrile. Night nurse did note anther episode of bleeding to left groin last night; pressure dressing applied. No active bleeding since. Excess bleeding likely from being on Plavix s/t recent cardiac stent placement. Patient did work with PT yesterday and says he was able to ambulate down the hallway and sit in the chair. Does continue to have intermittent nausea, relieved by Zofran as well as mid- back pain.       Objective     Vital Signs (Most Recent):      Temp: 98 °F (36.7 °C) (24 034)  Pulse: (!) 55 (24 0400)  Resp: 18 (24)  BP: 99/63 (24)  SpO2: 99 % (24) Vital Signs (24h Range):    Temp:  [97.9 °F (36.6 °C)-98.5 °F (36.9 °C)] 98 °F (36.7 °C)  Pulse:  [55-83] 55  Resp:  [17-20] 18  SpO2:  [98 %-100 %] 99 %  BP: ()/(60-75) 99/63        Intake/Output - Last 3 Shifts             P.O.  900     IV Piggyback 1200      Total Intake(mL/kg) 1200 (23.4) 900 (17.5)     Urine (mL/kg/hr) 700 (0.6) 750 (0.6)     Total Output 700 750     Net +500 +150                  Significant Labs:  BMP:   Recent Labs   Lab 24  2241 24  1151 24  0501      < > 136   K 4.1   < > 3.9      < > 103   CO2 22*   < > 25   BUN 68.8*   < > 55.5*   CREATININE 2.91*   < > 2.06*   CALCIUM 9.5   < > 9.2   MG 2.40  --   --     < > = values in this interval not displayed.     CBC:   Recent Labs   Lab 24  0501   WBC 7.99   RBC 2.38*   HGB 7.1*   HCT 21.1*      MCV 88.7   MCH 29.8   MCHC 33.6     Significant  Diagnostics:      CTA ABD AORTA BILAT RUNOFF VESSELS W WO (10/3/2024)     Impression  Infrarenal abdominal aortic aneurysm measuring 6 cm in diameter with prominent mural-based thrombus and focal soft tissue prominence along the left anterolateral aspect of the lower aneurysm sac which was not definitely seen on the prior study.     Extensive atherosclerotic disease is noted in the iliac and femoral arteries with no significant stenoses in the common iliac arteries but scattered moderate stenoses throughout both external iliac arteries. There is prominent atherosclerotic plaque in the common femoral arteries with areas of high-grade stenosis in the left and right common femoral arteries. The femoral artery plaque is mostly posterior.     Extensive atherosclerotic disease is noted throughout the right superficial femoral artery popliteal artery with a three-vessel runoff on the right though the runoff vessels appear diminutive with atherosclerotic calcifications throughout the right MICKI.     There is complete occlusion of the left SFA from just past its origin with reconstitution at the level of the mid popliteal artery via a prominent collateral from the compensatorily enlarged profundus artery. A normal three-vessel runoff is noted on the left.     Physical Exam:     Constitutional: Awake, alert, laying in bed, pallor noted  Cardiovascular: Regular rate, rhythm  Respiratory: Normal respiratory effort, on O2 per NC  Musculoskeletal: No tenderness to mid-back  Neurologic: Strength and sensation are equal bilaterally  Skin: Incisions to bilateral groins are C/D/I with overlying dermabond; no hematoma present. Left groin does have dressing in place with small amount of blood however no wound dehiscence noted       Assessment and Plan     Mr. Qiu is a 64 y.o. male with AAA s/p EVAR with right CFA endarterectomy and lithotripsy of bilateral iliac arteries.   - H&H did drop from yesterday - will transfuse 1 unit  pRBCs  - More bleeding from left groin last night per nurse, no active bleeding this morning; will continue to monitor  - Bed rest for the rest of the day   - Kidney function improving; continue to encourage oral hydration     The above findings, diagnostics, and treatment plan were discussed with Dr. Platt who is in agreement with the plan of care except as stated in additional documentation.      Aldo Knott PA-C  Vascular Surgery  [facility]    Active Diagnoses:     There are no hospital problems to display for this patient.        Medications:     Continuous Infusion:    0.9% NaCl   Intravenous Continuous           Scheduled Medications:    aspirin  81 mg Oral Daily    atorvastatin  40 mg Oral Daily    carvediloL  3.125 mg Oral BID WM    clopidogreL  75 mg Oral Daily    mupirocin   Nasal BID       PRN Medications:   Current Facility-Administered Medications:     0.9%  NaCl infusion (for blood administration), , Intravenous, Q24H PRN    albuterol-ipratropium, 3 mL, Nebulization, Q6H PRN    dextrose 10%, 12.5 g, Intravenous, PRN    dextrose 10%, 25 g, Intravenous, PRN    HYDROcodone-acetaminophen, 1 tablet, Oral, Q4H PRN    melatonin, 6 mg, Oral, Nightly PRN    ondansetron, 4 mg, Intravenous, Q6H PRN

## 2024-11-07 NOTE — PROGRESS NOTES
Inpatient Nutrition Assessment    Admit Date: 11/5/2024   Total duration of encounter: 2 days   Patient Age: 64 y.o.    Nutrition Recommendation/Prescription     Diet Adult Regular ordered  Add chocolate Boost Plus (provides 360 kcal and 14 g protein per container)  Encouraged adequate PO intake  Monitor appetite/PO intake, weight, and labs    Communication of Recommendations: reviewed with nurse and reviewed with patient    Nutrition Assessment     Malnutrition Assessment/Nutrition-Focused Physical Exam    Malnutrition Context: other (see comments) (Unable to determine at this time) (11/07/24 1457)  Malnutrition Level: other (see comments) (Unable to determine at this time) (11/07/24 1457)  Energy Intake (Malnutrition): less than 75% for greater than or equal to 1 month (11/07/24 1457)  Weight Loss (Malnutrition): other (see comments) (Does not meet criteria per EMR) (11/07/24 1457)  Subcutaneous Fat (Malnutrition): other (see comments) (Unable to assess) (11/07/24 1457)           Muscle Mass (Malnutrition): other (see comments) (Unable to assess) (11/07/24 1457)                          Fluid Accumulation (Malnutrition): other (see comments) (Unable to assess) (11/07/24 1457)        A minimum of two characteristics is recommended for diagnosis of either severe or non-severe malnutrition.    Chart Review    Reason Seen: continuous nutrition monitoring BMI <18.5 kg/m^2    Malnutrition Screening Tool Results   Have you recently lost weight without trying?: No  Have you been eating poorly because of a decreased appetite?: No   MST Score: 0   Diagnosis:  1. AAA (abdominal aortic aneurysm)  - Ambulatory referral/consult to Vascular Surgery     2. Coronary artery disease, unspecified vessel or lesion type, unspecified whether angina present, unspecified whether native or transplanted heart    Relevant Medical History:    AAA (abdominal aortic aneurysm)      CAD (coronary artery disease)      CHF (congestive heart  failure)      COPD (chronic obstructive pulmonary disease)      HTN (hypertension)      Ischemic cardiomyopathy 10/04/2024    Paroxysmal atrial fibrillation      PVD (peripheral vascular disease)        Scheduled Medications:  aluminum-magnesium hydroxide-simethicone, 30 mL, QID (AC & HS)  aspirin, 81 mg, Daily  atorvastatin, 40 mg, Daily  carvediloL, 3.125 mg, BID WM  clopidogreL, 75 mg, Daily  mupirocin, , BID    Continuous Infusions:  0.9% NaCl    PRN Medications:  0.9%  NaCl infusion (for blood administration), , Q24H PRN  albuterol-ipratropium, 3 mL, Q6H PRN  dextrose 10%, 12.5 g, PRN  dextrose 10%, 25 g, PRN  HYDROcodone-acetaminophen, 1 tablet, Q4H PRN  melatonin, 6 mg, Nightly PRN  ondansetron, 4 mg, Q6H PRN  promethazine, 25 mg, Q8H PRN    Calorie Containing IV Medications: no significant kcals from medications at this time    Recent Labs   Lab 11/05/24  2241 11/06/24  1049 11/06/24  1151 11/07/24  0501 11/07/24  1103     --  135* 136  --    K 4.1  --  4.0 3.9  --    CALCIUM 9.5  --  9.2 9.2  --    MG 2.40  --   --   --   --      --  99 103  --    CO2 22*  --  27 25  --    BUN 68.8*  --  66.2* 55.5*  --    CREATININE 2.91*  --  2.66* 2.06*  --    EGFRNORACEVR 23  --  26 35  --    GLUCOSE 147*  --  123* 108  --    BILITOT 0.4  --   --   --   --    ALKPHOS 70  --   --   --   --    ALT 6  --   --   --   --    AST 15  --   --   --   --    ALBUMIN 3.3*  --   --   --   --    LIPASE  --   --   --   --  7   WBC  --  9.79  --  7.99  --    HGB  --  7.7*  --  7.1*  --    HCT  --  22.7*  --  21.1*  --      Nutrition Orders:  Diet Adult Regular      Appetite/Oral Intake: poor/25-50% of meals  Factors Affecting Nutritional Intake: decreased appetite  Social Needs Impacting Access to Food: none identified  Food/Pentecostal/Cultural Preferences: none reported  Food Allergies: none reported  Last Bowel Movement: 11/04/24  Wound(s):  none noted    Comments    11/7/2024: Pt declined NFPE at time of visit, will  "attempt upon f/u. Pt reports a poor appetite/PO intake >6 months prior to admit and currently. Pt agreeable to chocolate ONS. Encouraged adequate PO intake. Pt denies vomiting, constipation, diarrhea, and chewing/swallowing difficulties. Pt reports nausea. Pt wears dentures, denies problems. Per EMR, pt weighed 52.2 kg on 10/14/2024 (1.7% wt loss in 1 month, insignificant). Last BM noted. Will monitor.    Anthropometrics    Height: 5' 6" (167.6 cm), Height Method: Stated  Last Weight: 51.3 kg (113 lb) (24 0424), Weight Method: Standard Scale  BMI (Calculated): 18.2  BMI Classification: underweight (BMI less than 18.5)        Ideal Body Weight (IBW), Male: 142 lb     % Ideal Body Weight, Male (lb): 79.58 %                 Usual Body Weight (UBW), k.2 kg  % Usual Body Weight: 98.4     Usual Weight Provided By: EMR weight history    Wt Readings from Last 5 Encounters:   24 51.3 kg (113 lb)   10/14/24 52.2 kg (115 lb)     Weight Change(s) Since Admission:   2024: 51.3 kg  Wt Readings from Last 1 Encounters:   24 0424 51.3 kg (113 lb)   24 0208 51.3 kg (113 lb)   10/29/24 1229 51.3 kg (113 lb)   Admit Weight: 51.3 kg (113 lb) (10/29/24 1229), Weight Method: Stated    Estimated Needs    Weight Used For Calorie Calculations: 51.3 kg (113 lb 1.5 oz)  Energy Calorie Requirements (kcal): 8615-8499 (30-35 kcal/kg)  Energy Need Method: Kcal/kg  Weight Used For Protein Calculations: 51.3 kg (113 lb 1.5 oz)  Protein Requirements: 61-77 (1.2-1.5 g/kg)  Fluid Requirements (mL): 1539 (1 mL/kcal)  CHO Requirement: 173-211 g/day (~45-55% est min kcal needs)     Enteral Nutrition     Patient not receiving enteral nutrition at this time.    Parenteral Nutrition     Patient not receiving parenteral nutrition support at this time.    Evaluation of Received Nutrient Intake    Calories: not meeting estimated needs  Protein: not meeting estimated needs    Patient Education     Not applicable.    Nutrition " Diagnosis     PES: Increased nutrient needs (kcal and protein) related to chronic illness as evidenced by increased nutrient demand. (new)     PES:  Unable to assess malnutrition related to  unable to assess as evidenced by  unable to assess . (new)    Nutrition Interventions     Intervention(s): general/healthful diet and commercial beverage    Goal: Meet greater than 80% of nutritional needs by follow-up. (new)  Goal: Consume % of oral supplements by follow-up. (new)    Nutrition Goals & Monitoring     Dietitian will monitor: food and beverage intake, weight, electrolyte/renal panel, glucose/endocrine profile, and gastrointestinal profile  Discharge planning: continue regular diet with Boost Plus BID oral supplements  Nutrition Risk/Follow-Up: high (follow-up in 1-4 days)   Please consult if re-assessment needed sooner.

## 2024-11-08 LAB
ALBUMIN SERPL-MCNC: 3 G/DL (ref 3.4–4.8)
ALBUMIN/GLOB SERPL: 1 RATIO (ref 1.1–2)
ALP SERPL-CCNC: 55 UNIT/L (ref 40–150)
ALT SERPL-CCNC: 8 UNIT/L (ref 0–55)
ANION GAP SERPL CALC-SCNC: 7 MEQ/L
AST SERPL-CCNC: 19 UNIT/L (ref 5–34)
AV INDEX (PROSTH): 0.61
AV MEAN GRADIENT: 4 MMHG
AV PEAK GRADIENT: 6.8 MMHG
AV VALVE AREA BY VELOCITY RATIO: 1.9 CM²
AV VALVE AREA: 1.9 CM²
AV VELOCITY RATIO: 0.62
BASOPHILS # BLD AUTO: 0.03 X10(3)/MCL
BASOPHILS NFR BLD AUTO: 0.3 %
BILIRUB SERPL-MCNC: 0.7 MG/DL
BSA FOR ECHO PROCEDURE: 1.54 M2
BUN SERPL-MCNC: 38.3 MG/DL (ref 8.4–25.7)
CALCIUM SERPL-MCNC: 8.8 MG/DL (ref 8.8–10)
CHLORIDE SERPL-SCNC: 104 MMOL/L (ref 98–107)
CO2 SERPL-SCNC: 28 MMOL/L (ref 23–31)
CREAT SERPL-MCNC: 1.67 MG/DL (ref 0.72–1.25)
CREAT/UREA NIT SERPL: 23
CV ECHO LV RWT: 0.32 CM
DOP CALC AO PEAK VEL: 1.3 M/S
DOP CALC AO VTI: 22.3 CM
DOP CALC LVOT AREA: 3.1 CM2
DOP CALC LVOT DIAMETER: 2 CM
DOP CALC LVOT PEAK VEL: 0.8 M/S
DOP CALC LVOT STROKE VOLUME: 42.4 CM3
DOP CALC MV VTI: 18.5 CM
DOP CALCLVOT PEAK VEL VTI: 13.5 CM
E WAVE DECELERATION TIME: 180 MSEC
E/A RATIO: 0.54
E/E' RATIO: 6.14 M/S
ECHO LV POSTERIOR WALL: 0.9 CM (ref 0.6–1.1)
EOSINOPHIL # BLD AUTO: 0.06 X10(3)/MCL (ref 0–0.9)
EOSINOPHIL NFR BLD AUTO: 0.7 %
ERYTHROCYTE [DISTWIDTH] IN BLOOD BY AUTOMATED COUNT: 14.9 % (ref 11.5–17)
FRACTIONAL SHORTENING: 24.6 % (ref 28–44)
GFR SERPLBLD CREATININE-BSD FMLA CKD-EPI: 45 ML/MIN/1.73/M2
GLOBULIN SER-MCNC: 2.9 GM/DL (ref 2.4–3.5)
GLUCOSE SERPL-MCNC: 148 MG/DL (ref 82–115)
HCT VFR BLD AUTO: 23.7 % (ref 42–52)
HGB BLD-MCNC: 8 G/DL (ref 14–18)
HR MV ECHO: 76 BPM
IMM GRANULOCYTES # BLD AUTO: 0.04 X10(3)/MCL (ref 0–0.04)
IMM GRANULOCYTES NFR BLD AUTO: 0.4 %
INTERVENTRICULAR SEPTUM: 0.9 CM (ref 0.6–1.1)
LEFT ATRIUM AREA SYSTOLIC (APICAL 4 CHAMBER): 12.4 CM2
LEFT ATRIUM SIZE: 3.1 CM
LEFT INTERNAL DIMENSION IN SYSTOLE: 4.3 CM (ref 2.1–4)
LEFT VENTRICLE DIASTOLIC VOLUME INDEX: 100 ML/M2
LEFT VENTRICLE DIASTOLIC VOLUME: 157 ML
LEFT VENTRICLE END SYSTOLIC VOLUME APICAL 4 CHAMBER: 27.9 ML
LEFT VENTRICLE MASS INDEX: 125.8 G/M2
LEFT VENTRICLE SYSTOLIC VOLUME INDEX: 53.5 ML/M2
LEFT VENTRICLE SYSTOLIC VOLUME: 84 ML
LEFT VENTRICULAR INTERNAL DIMENSION IN DIASTOLE: 5.7 CM (ref 3.5–6)
LEFT VENTRICULAR MASS: 197.5 G
LV LATERAL E/E' RATIO: 4.3 M/S
LV SEPTAL E/E' RATIO: 10.75 M/S
LVED V (TEICH): 157 ML
LVES V (TEICH): 84 ML
LVOT MG: 1 MMHG
LVOT MV: 0.5 CM/S
LYMPHOCYTES # BLD AUTO: 1.7 X10(3)/MCL (ref 0.6–4.6)
LYMPHOCYTES NFR BLD AUTO: 18.5 %
MCH RBC QN AUTO: 30.1 PG (ref 27–31)
MCHC RBC AUTO-ENTMCNC: 33.8 G/DL (ref 33–36)
MCV RBC AUTO: 89.1 FL (ref 80–94)
MONOCYTES # BLD AUTO: 0.92 X10(3)/MCL (ref 0.1–1.3)
MONOCYTES NFR BLD AUTO: 10 %
MV MEAN GRADIENT: 1 MMHG
MV PEAK A VEL: 0.79 M/S
MV PEAK E VEL: 0.43 M/S
MV PEAK GRADIENT: 4 MMHG
MV STENOSIS PRESSURE HALF TIME: 111 MS
MV VALVE AREA BY CONTINUITY EQUATION: 2.29 CM2
MV VALVE AREA P 1/2 METHOD: 1.98 CM2
NEUTROPHILS # BLD AUTO: 6.46 X10(3)/MCL (ref 2.1–9.2)
NEUTROPHILS NFR BLD AUTO: 70.1 %
NRBC BLD AUTO-RTO: 0 %
PLATELET # BLD AUTO: 133 X10(3)/MCL (ref 130–400)
PMV BLD AUTO: 10.1 FL (ref 7.4–10.4)
POTASSIUM SERPL-SCNC: 4.4 MMOL/L (ref 3.5–5.1)
PROT SERPL-MCNC: 5.9 GM/DL (ref 5.8–7.6)
RA PRESSURE ESTIMATED: 3 MMHG
RBC # BLD AUTO: 2.66 X10(6)/MCL (ref 4.7–6.1)
SINUS: 3.5 CM
SODIUM SERPL-SCNC: 139 MMOL/L (ref 136–145)
TDI LATERAL: 0.1 M/S
TDI SEPTAL: 0.04 M/S
TDI: 0.07 M/S
TRICUSPID ANNULAR PLANE SYSTOLIC EXCURSION: 1.75 CM
TROPONIN I SERPL-MCNC: 0.04 NG/ML (ref 0–0.04)
TROPONIN I SERPL-MCNC: 0.08 NG/ML (ref 0–0.04)
TROPONIN I SERPL-MCNC: 0.1 NG/ML (ref 0–0.04)
WBC # BLD AUTO: 9.21 X10(3)/MCL (ref 4.5–11.5)
Z-SCORE OF LEFT VENTRICULAR DIMENSION IN END DIASTOLE: 2.34
Z-SCORE OF LEFT VENTRICULAR DIMENSION IN END SYSTOLE: 3.39

## 2024-11-08 PROCEDURE — 80053 COMPREHEN METABOLIC PANEL: CPT | Performed by: PHYSICIAN ASSISTANT

## 2024-11-08 PROCEDURE — 63600175 PHARM REV CODE 636 W HCPCS: Performed by: SURGERY

## 2024-11-08 PROCEDURE — 85025 COMPLETE CBC W/AUTO DIFF WBC: CPT | Performed by: PHYSICIAN ASSISTANT

## 2024-11-08 PROCEDURE — 21400001 HC TELEMETRY ROOM

## 2024-11-08 PROCEDURE — 25000003 PHARM REV CODE 250: Performed by: SURGERY

## 2024-11-08 PROCEDURE — 84484 ASSAY OF TROPONIN QUANT: CPT | Performed by: PHYSICIAN ASSISTANT

## 2024-11-08 PROCEDURE — 36415 COLL VENOUS BLD VENIPUNCTURE: CPT | Performed by: PHYSICIAN ASSISTANT

## 2024-11-08 PROCEDURE — 97530 THERAPEUTIC ACTIVITIES: CPT

## 2024-11-08 PROCEDURE — 25000003 PHARM REV CODE 250: Performed by: PHYSICIAN ASSISTANT

## 2024-11-08 PROCEDURE — 84484 ASSAY OF TROPONIN QUANT: CPT

## 2024-11-08 RX ADMIN — ALUMINUM HYDROXIDE, MAGNESIUM HYDROXIDE, AND SIMETHICONE 30 ML: 1200; 120; 1200 SUSPENSION ORAL at 11:11

## 2024-11-08 RX ADMIN — ATORVASTATIN CALCIUM 40 MG: 40 TABLET, FILM COATED ORAL at 09:11

## 2024-11-08 RX ADMIN — ASPIRIN 81 MG CHEWABLE TABLET 81 MG: 81 TABLET CHEWABLE at 09:11

## 2024-11-08 RX ADMIN — CLOPIDOGREL BISULFATE 75 MG: 75 TABLET ORAL at 09:11

## 2024-11-08 RX ADMIN — ALUMINUM HYDROXIDE, MAGNESIUM HYDROXIDE, AND SIMETHICONE 30 ML: 1200; 120; 1200 SUSPENSION ORAL at 05:11

## 2024-11-08 RX ADMIN — ALUMINUM HYDROXIDE, MAGNESIUM HYDROXIDE, AND SIMETHICONE 30 ML: 1200; 120; 1200 SUSPENSION ORAL at 04:11

## 2024-11-08 RX ADMIN — ONDANSETRON 4 MG: 2 INJECTION INTRAMUSCULAR; INTRAVENOUS at 01:11

## 2024-11-08 RX ADMIN — CARVEDILOL 3.12 MG: 3.12 TABLET, FILM COATED ORAL at 04:11

## 2024-11-08 RX ADMIN — ALUMINUM HYDROXIDE, MAGNESIUM HYDROXIDE, AND SIMETHICONE 30 ML: 1200; 120; 1200 SUSPENSION ORAL at 08:11

## 2024-11-08 RX ADMIN — MUPIROCIN: 20 OINTMENT TOPICAL at 08:11

## 2024-11-08 RX ADMIN — MUPIROCIN: 20 OINTMENT TOPICAL at 11:11

## 2024-11-08 RX ADMIN — HYDROCODONE BITARTRATE AND ACETAMINOPHEN 1 TABLET: 5; 325 TABLET ORAL at 01:11

## 2024-11-08 RX ADMIN — CARVEDILOL 3.12 MG: 3.12 TABLET, FILM COATED ORAL at 09:11

## 2024-11-08 NOTE — PROGRESS NOTES
Vascular Surgery - Progress Note  Aldo Knott PA-C    Patient Name: Heriberto Qiu                   : 1960     MRN: [id number]   Date of Admission: 2024  Date of Exam: 2024     Subjective:     Post-Op Information:  REPAIR-ANEURYSM-ABDOMINAL AORTIC-ENDOVASCULAR (AAA) (N/A)  3 Days Post-Op    Interval History:  Yesterday afternoon, patient began complaining of epigastric pain.  Cardiac enzymes and EKG were ordered to rule out of ACS.  Troponin did come back elevated and Cardiology was consulted in light of patient's recent cardiac stent placement.  They did obtain echo and are continuing to follow up.  On exam this morning, patient states he does feel better.  Phenergan is helping with nausea.  No new complaints.    Objective     Vital Signs (Most Recent):      Temp: 98 °F (36.7 °C) (24 1131)  Pulse: 62 (24 1131)  Resp: 18 (24 1131)  BP: 109/65 (24 1131)  SpO2: 100 % (24 1131) Vital Signs (24h Range):    Temp:  [97.9 °F (36.6 °C)-98.9 °F (37.2 °C)] 98 °F (36.7 °C)  Pulse:  [62-92] 62  Resp:  [18-19] 18  SpO2:  [97 %-100 %] 100 %  BP: ()/(52-95) 109/65        Intake/Output - Last 3 Shifts             P.O. 900 280     Blood  482.1     IV Piggyback       Total Intake(mL/kg) 900 (17.5) 762.1 (14.9)     Urine (mL/kg/hr) 750 (0.6) 975 (0.8)     Stool  0     Total Output 750 975     Net +150 -212.9            Stool Occurrence  0 x           Significant Labs:  CBC:   Recent Labs   Lab 24  0720   WBC 9.21   RBC 2.66*   HGB 8.0*   HCT 23.7*      MCV 89.1   MCH 30.1   MCHC 33.8     CMP:   Recent Labs   Lab 24  0720   CALCIUM 8.8   ALBUMIN 3.0*      K 4.4   CO2 28      BUN 38.3*   CREATININE 1.67*   ALKPHOS 55   ALT 8   AST 19   BILITOT 0.7     Significant Diagnostics:      EKG (2024)  Vent. Rate : 075 BPM     Atrial Rate : 075 BPM     P-R Int : 162 ms          QRS  Dur : 106 ms      QT Int : 348 ms       P-R-T Axes : 077 077 238 degrees     QTc Int : 388 ms    Sinus rhythm with occasional Premature ventricular complexes  ST and T wave abnormality, consider inferior ischemia  ST and T wave abnormality, consider anterolateral ischemia  Abnormal ECG     Physical Exam:     Constitutional: Awake, alert, laying in bed, color improving   Cardiovascular: Good palpable pulses bilaterally   Respiratory: Normal respiratory effort, on O2 per NC  Neurologic: Strength and sensation are equal bilaterally  Skin: Incisions to bilateral groins are C/D/I with overlying dermabond; no hematoma present. Left groin is dressed, only scant amount of bleeding       Assessment and Plan     Mr. Qiu is a 64 y.o. male with AAA s/p EVAR with right CFA endarterectomy and lithotripsy of bilateral iliac arteries.   - NSTEMI yesterday - appreciate cardiology recommendations   - H&H improving after 1 unit, will continue to monitor   - Encourage OOB and ambulation; can resume working with PT  - Continue to encourage oral hydration - kidney function continues to improve    The above findings, diagnostics, and treatment plan were discussed with Dr. Platt who is in agreement with the plan of care except as stated in additional documentation.      Aldo Knott PA-C  Vascular Surgery  [facility]    Active Diagnoses:     There are no hospital problems to display for this patient.        Medications:     Continuous Infusion:     Scheduled Medications:    aluminum-magnesium hydroxide-simethicone  30 mL Oral QID (AC & HS)    aspirin  81 mg Oral Daily    atorvastatin  40 mg Oral Daily    carvediloL  3.125 mg Oral BID WM    clopidogreL  75 mg Oral Daily    mupirocin   Nasal BID       PRN Medications:   Current Facility-Administered Medications:     0.9%  NaCl infusion (for blood administration), , Intravenous, Q24H PRN    albuterol-ipratropium, 3 mL, Nebulization, Q6H PRN    dextrose 10%, 12.5 g,  Intravenous, PRN    dextrose 10%, 25 g, Intravenous, PRN    HYDROcodone-acetaminophen, 1 tablet, Oral, Q4H PRN    melatonin, 6 mg, Oral, Nightly PRN    ondansetron, 4 mg, Intravenous, Q6H PRN    promethazine, 25 mg, Intramuscular, Q8H PRN

## 2024-11-08 NOTE — PROGRESS NOTES
" [facility] - 6th Floor Medical Telemetry    Cardiology  Progress Note    Patient Name: Heriberto Qiu  MRN: [id number]  Admission Date: 11/5/2024  Hospital Length of Stay: 3 days  Code Status: No Order   Attending Provider: Gerhard Platt MD   Consulting Provider: ELIJAH Locke  Primary Care Physician: Juan Carlos Oshea MD  Principal Problem:<principal problem not specified>    Patient information was obtained from patient, past medical records, and ER records.     Subjective:     Reason for Consult: NSTEMI    HPI: Mr. Qiu is a 64 year old male who is unknown to [facility]. He presents to [facility] on 11.5.24 for a scheduled EVAR w/ Right CFA Endarterectomy and Lithotripsy of Bilateral Iliac Arteries. He did well in the post-operative period. He did have some bleeding from his left groin site. On 11.7.24, he endorsed some epigastric discomfort but denies CP, SOB, or palps. Of note, he underwent LHC on 10.22.24 and had PCI of his LAD. Significant labs include H&H 7.1/21.1, B/Cr 55.5, & trop 0.077. He denies CP on examination. [facility] has been consulted to further evaluate the patient's elevated troponin.     11.8.24: NAD. Denies Cp, SOB, or palps. Sitting up in chair. Denies complaints. "I feel better." BP soft but stable. On 2 L NC. SR on tele.     PMH: AAA, CAD, CHF, COPD, HTN, ICMO,  PVD  PSH: Small Intestine Surgery, EVAR  Family History: Mother - Heart Disease; Father - Heart Attack   Social History: Former tobacco use. Denies alcohol or illicit drug use.     Previous Cardiac Diagnostics:   LHC (10.22.24):  FINDINGS:   LEFT MAIN: Ostial left main has 30% stenosis.  FFR of left main was   performed which was negative at 0.96.  On IVUS MLA was more than 9 mm care   there is diffuse plaque in the left main   LEFT ANTERIOR DESCENDING ARTERY: Proximal LAD has 90% stenosis.  Distally   vessel is small and has mild diffuse disease supplies collateral to RCA   LEFT CIRCUMFLEX: Small vessel.  Completely " occluding distal segment.    Proximal and mid segment has severe diffuse disease   RIGHT CORONARY ARTERY: Not injected.  Known occluded from before   LVEDP: 15   No gradient  between LV-AO on pull back   PROCEDURE PERFORMED:   Left heart cath   DFR and FFR of left main   IVUS of left main   Successful IVUS guided PCI of LAD       Review of patient's allergies indicates:   Allergen Reactions    Cefadroxil Hives     No current facility-administered medications on file prior to encounter.     Current Outpatient Medications on File Prior to Encounter   Medication Sig    ALPRAZolam (XANAX) 0.5 MG tablet Take 1 tablet by mouth nightly as needed.    aspirin 81 MG Chew Take 1 tablet by mouth every morning.    atorvastatin (LIPITOR) 40 MG tablet Take 1 tablet by mouth every morning.    BREZTRI AEROSPHERE 160-9-4.8 mcg/actuation HFAA Inhale 1 puff into the lungs Daily.    carvediloL (COREG) 3.125 MG tablet Take 3.125 mg by mouth 2 (two) times daily with meals.    clopidogreL (PLAVIX) 75 mg tablet Take 1 tablet by mouth every morning.    COMBIVENT RESPIMAT  mcg/actuation inhaler Inhale 1 puff into the lungs 4 (four) times daily.    lisinopriL (PRINIVIL,ZESTRIL) 5 MG tablet Take 1 tablet by mouth every morning.    pantoprazole (PROTONIX) 40 MG tablet Take 1 tablet by mouth every morning.    torsemide (DEMADEX) 10 MG Tab Take 1 tablet by mouth every morning.    temazepam (RESTORIL) 30 mg capsule Take 1 capsule by mouth nightly as needed.       Review of Systems   Respiratory:  Negative for shortness of breath.    Cardiovascular:  Negative for chest pain and palpitations.   Musculoskeletal:  Positive for back pain.       Objective:     Vital Signs (Most Recent):  Temp: 98.4 °F (36.9 °C) (11/08/24 1523)  Pulse: 71 (11/08/24 1523)  Resp: 20 (11/08/24 1523)  BP: (!) 100/57 (11/08/24 1523)  SpO2: 99 % (11/08/24 1523) Vital Signs (24h Range):  Temp:  [98 °F (36.7 °C)-98.9 °F (37.2 °C)] 98.4 °F (36.9 °C)  Pulse:  [62-90]  "71  Resp:  [18-20] 20  SpO2:  [98 %-100 %] 99 %  BP: ()/(52-80) 100/57   Weight: 51.3 kg (113 lb)  Body mass index is 18.24 kg/m².  SpO2: 99 %       Intake/Output Summary (Last 24 hours) at 11/8/2024 1635  Last data filed at 11/8/2024 1412  Gross per 24 hour   Intake 1082.08 ml   Output 775 ml   Net 307.08 ml     Lines/Drains/Airways       Peripheral Intravenous Line  Duration                  Peripheral IV - Single Lumen 11/07/24 1511 20 G Posterior;Right Forearm 1 day                  Significant Labs:   Chemistries:   Recent Labs   Lab 11/05/24  2241 11/06/24  1151 11/07/24  0501 11/07/24  1103 11/07/24  1800 11/08/24  0019 11/08/24  0503 11/08/24  0720 11/08/24  1018    135* 136  --   --   --   --  139  --    K 4.1 4.0 3.9  --   --   --   --  4.4  --     99 103  --   --   --   --  104  --    CO2 22* 27 25  --   --   --   --  28  --    BUN 68.8* 66.2* 55.5*  --   --   --   --  38.3*  --    CREATININE 2.91* 2.66* 2.06*  --   --   --   --  1.67*  --    CALCIUM 9.5 9.2 9.2  --   --   --   --  8.8  --    BILITOT 0.4  --   --   --   --   --   --  0.7  --    ALKPHOS 70  --   --   --   --   --   --  55  --    ALT 6  --   --   --   --   --   --  8  --    AST 15  --   --   --   --   --   --  19  --    GLUCOSE 147* 123* 108  --   --   --   --  148*  --    MG 2.40  --   --   --   --   --   --   --   --    TROPONINI  --   --   --  0.077* 0.057* 0.077* 0.099*  --  0.040        CBC/Anemia Labs: Coags:    Recent Labs   Lab 11/06/24  1049 11/07/24  0501 11/08/24  0720   WBC 9.79 7.99 9.21   HGB 7.7* 7.1* 8.0*   HCT 22.7* 21.1* 23.7*    148 133   MCV 87.6 88.7 89.1   RDW 15.3 15.5 14.9    No results for input(s): "PT", "INR", "APTT" in the last 168 hours.     Significant Imaging:    EKG:       Telemetry:  SR    Physical Exam  HENT:      Head: Normocephalic.      Nose: Nose normal.      Mouth/Throat:      Mouth: Mucous membranes are moist.   Eyes:      Extraocular Movements: Extraocular movements intact. "   Cardiovascular:      Rate and Rhythm: Normal rate and regular rhythm.      Pulses: Normal pulses.      Heart sounds: Normal heart sounds.   Pulmonary:      Effort: Pulmonary effort is normal.   Abdominal:      Palpations: Abdomen is soft.   Skin:     General: Skin is warm.      Comments: Bilateral groin sites - c/d/i   Neurological:      Mental Status: He is alert and oriented to person, place, and time.   Psychiatric:         Behavior: Behavior normal.         Home Medications:   No current facility-administered medications on file prior to encounter.     Current Outpatient Medications on File Prior to Encounter   Medication Sig Dispense Refill    ALPRAZolam (XANAX) 0.5 MG tablet Take 1 tablet by mouth nightly as needed.      aspirin 81 MG Chew Take 1 tablet by mouth every morning.      atorvastatin (LIPITOR) 40 MG tablet Take 1 tablet by mouth every morning.      BREZTRI AEROSPHERE 160-9-4.8 mcg/actuation HFAA Inhale 1 puff into the lungs Daily.      carvediloL (COREG) 3.125 MG tablet Take 3.125 mg by mouth 2 (two) times daily with meals.      clopidogreL (PLAVIX) 75 mg tablet Take 1 tablet by mouth every morning.      COMBIVENT RESPIMAT  mcg/actuation inhaler Inhale 1 puff into the lungs 4 (four) times daily.      lisinopriL (PRINIVIL,ZESTRIL) 5 MG tablet Take 1 tablet by mouth every morning.      pantoprazole (PROTONIX) 40 MG tablet Take 1 tablet by mouth every morning.      torsemide (DEMADEX) 10 MG Tab Take 1 tablet by mouth every morning.      temazepam (RESTORIL) 30 mg capsule Take 1 capsule by mouth nightly as needed.       Current Schedule Inpatient Medications:   aluminum-magnesium hydroxide-simethicone  30 mL Oral QID (AC & HS)    aspirin  81 mg Oral Daily    atorvastatin  40 mg Oral Daily    carvediloL  3.125 mg Oral BID WM    clopidogreL  75 mg Oral Daily    mupirocin   Nasal BID     Continuous Infusions:      Assessment:   NSTEMI - Suspect Type 2 Due to Anemia & FAISAL  AAA (6 cm)    - s/p EVAR  w/ Right CFA Endarterectomy and Lithotripsy of Bilateral Iliac Arteries (11.5.24)  CAD    - LHC (10.22.24): PCI of LAD  HTN  Anemia - Improving    - s/p PRBC infusion   FAISAL - Improving  PAD  No Hx of GIB    Plan:   Echo reviewed LVEF intact. No wall motion abnormalities noted.  His troponin levels have trended down.   Continue DAPT (ASA/Plavix) for recent cardiac stenting.  Low suspicion of ACS.  Due to CAD & recent stenting, recommend keeping hemoglobin > 9.   F/u with patient's primary cardiologist at discharge.    Will be available as needed.     ELIJAH Locke  Cardiology  [facility] - 6th Floor Medical Telemetry  11/08/2024     I agree with the findings of the complexity of problems addressed and take responsibility for the plan's risks and complications. I approved the plan documented by Josefa Jay NP.

## 2024-11-08 NOTE — PLAN OF CARE
11/08/24 0930   Discharge Reassessment   Assessment Type Discharge Planning Reassessment   Discharge Plan discussed with: Patient   Discharge Plan A Home Health   Discharge Plan B Home Health   DME Needed Upon Discharge  none   Post-Acute Status   Post-Acute Authorization Home Health     Patient is current with [facility] in [place]. Patient already has a walker and home oxygen. Friend or cousin will transport home.

## 2024-11-08 NOTE — PT/OT/SLP PROGRESS
"Physical Therapy Treatment    Patient Name:  Heriberto Qiu   MRN:  [id number]    Recommendations:     Discharge therapy intensity: Low Intensity Therapy   Discharge Equipment Recommendations: walker, rolling  Barriers to discharge: Decreased caregiver support and Ongoing medical needs    Assessment:     Heriberto Qiu is a 64 y.o. male admitted with a medical diagnosis of  AAA s/p EVAR with R CFA endarterectomy and lithotripsy of B iliac arteries.  He presents with the following impairments/functional limitations: impaired endurance, impaired self care skills, impaired functional mobility, gait instability, impaired balance, pain. Pt now off bedrest; however, decreased endurance noted from immobilization. Pt ambulated around room without AD CGA prior to seated rest. Declined further 2/2 fatigue. Pt lives alone, declining any placement options stating he can manage at home. Will continue to mobilize while in acute to improve endurance/activity tolerance.     Rehab Prognosis: Good; patient would benefit from acute skilled PT services to address these deficits and reach maximum level of function.    Recent Surgery: Procedure(s) (LRB):  REPAIR-ANEURYSM-ABDOMINAL AORTIC-ENDOVASCULAR (AAA) (N/A) 3 Days Post-Op    Plan:     During this hospitalization, patient would benefit from acute PT services 5 x/week to address the identified rehab impairments via gait training, therapeutic activities, therapeutic exercises and progress toward the following goals:    Plan of Care Expires:  12/06/24    Subjective     Chief Complaint: "I'm cold"  Patient/Family Comments/goals: to get stronger  Pain/Comfort:  Pain Rating 1: 0/10      Objective:     Communicated with RN prior to session.  Patient found HOB elevated with telemetry, pulse ox (continuous), peripheral IV upon PT entry to room.     General Precautions: Standard, fall  Orthopedic Precautions: N/A  Braces: N/A  Respiratory Status: Nasal cannula, flow 2 L/min 98%    Functional " Mobility:  Bed Mobility:     Supine to Sit: stand by assistance  Transfers:     Sit to Stand:  stand by assistance with no AD  Bed to Chair: contact guard assistance with  no AD  using  Step Transfer  Gait: Pt ambulated around room x 1 rep without AD CGA. Slow gait speed, no major LOB. Decreased endurance requiring seated rest.       Education:  Patient provided with verbal education education regarding PT role/goals/POC, fall prevention, and safety awareness.  Understanding was verbalized.     Patient left up in chair with all lines intact, call button in reach, and RN notified    GOALS:   Multidisciplinary Problems       Physical Therapy Goals          Problem: Physical Therapy    Goal Priority Disciplines Outcome Interventions   Physical Therapy Goal     PT, PT/OT Progressing    Description: Goals to be met by: 24     Patient will increase functional independence with mobility by performin. Supine to sit with [place]  2. Sit to supine with [place]  3. Sit to stand transfer with [place]   4. Gait  x 300 feet with [place] using LRAD                         Time Tracking:     PT Received On: 24  PT Start Time: 1023     PT Stop Time: 1033  PT Total Time (min): 10 min     Billable Minutes: Therapeutic Activity 10    Treatment Type: Treatment  PT/PTA: PT     Number of PTA visits since last PT visit: 1     2024

## 2024-11-09 LAB
ABO + RH BLD: NORMAL
ANION GAP SERPL CALC-SCNC: 6 MEQ/L
BASOPHILS # BLD AUTO: 0.03 X10(3)/MCL
BASOPHILS NFR BLD AUTO: 0.3 %
BLD PROD TYP BPU: NORMAL
BLOOD UNIT EXPIRATION DATE: NORMAL
BLOOD UNIT TYPE CODE: 5100
BUN SERPL-MCNC: 34.6 MG/DL (ref 8.4–25.7)
CALCIUM SERPL-MCNC: 8.3 MG/DL (ref 8.8–10)
CHLORIDE SERPL-SCNC: 103 MMOL/L (ref 98–107)
CO2 SERPL-SCNC: 27 MMOL/L (ref 23–31)
CREAT SERPL-MCNC: 1.54 MG/DL (ref 0.72–1.25)
CREAT/UREA NIT SERPL: 22
CROSSMATCH INTERPRETATION: NORMAL
DISPENSE STATUS: NORMAL
EOSINOPHIL # BLD AUTO: 0.21 X10(3)/MCL (ref 0–0.9)
EOSINOPHIL NFR BLD AUTO: 2 %
ERYTHROCYTE [DISTWIDTH] IN BLOOD BY AUTOMATED COUNT: 14.9 % (ref 11.5–17)
GFR SERPLBLD CREATININE-BSD FMLA CKD-EPI: 49 ML/MIN/1.73/M2
GLUCOSE SERPL-MCNC: 123 MG/DL (ref 82–115)
GROUP & RH: NORMAL
HCT VFR BLD AUTO: 21 % (ref 42–52)
HGB BLD-MCNC: 7 G/DL (ref 14–18)
IMM GRANULOCYTES # BLD AUTO: 0.04 X10(3)/MCL (ref 0–0.04)
IMM GRANULOCYTES NFR BLD AUTO: 0.4 %
INDIRECT COOMBS: NORMAL
LYMPHOCYTES # BLD AUTO: 2.02 X10(3)/MCL (ref 0.6–4.6)
LYMPHOCYTES NFR BLD AUTO: 19.2 %
MCH RBC QN AUTO: 30.6 PG (ref 27–31)
MCHC RBC AUTO-ENTMCNC: 33.3 G/DL (ref 33–36)
MCV RBC AUTO: 91.7 FL (ref 80–94)
MONOCYTES # BLD AUTO: 0.76 X10(3)/MCL (ref 0.1–1.3)
MONOCYTES NFR BLD AUTO: 7.2 %
NEUTROPHILS # BLD AUTO: 7.44 X10(3)/MCL (ref 2.1–9.2)
NEUTROPHILS NFR BLD AUTO: 70.9 %
NRBC BLD AUTO-RTO: 0 %
PLATELET # BLD AUTO: 148 X10(3)/MCL (ref 130–400)
PMV BLD AUTO: 10.4 FL (ref 7.4–10.4)
POTASSIUM SERPL-SCNC: 4.2 MMOL/L (ref 3.5–5.1)
RBC # BLD AUTO: 2.29 X10(6)/MCL (ref 4.7–6.1)
RBCS: NORMAL
SODIUM SERPL-SCNC: 136 MMOL/L (ref 136–145)
SPECIMEN OUTDATE: NORMAL
UNIT NUMBER: NORMAL
WBC # BLD AUTO: 10.5 X10(3)/MCL (ref 4.5–11.5)

## 2024-11-09 PROCEDURE — P9016 RBC LEUKOCYTES REDUCED: HCPCS | Performed by: PHYSICIAN ASSISTANT

## 2024-11-09 PROCEDURE — 36430 TRANSFUSION BLD/BLD COMPNT: CPT

## 2024-11-09 PROCEDURE — 80048 BASIC METABOLIC PNL TOTAL CA: CPT | Performed by: PHYSICIAN ASSISTANT

## 2024-11-09 PROCEDURE — 85025 COMPLETE CBC W/AUTO DIFF WBC: CPT | Performed by: PHYSICIAN ASSISTANT

## 2024-11-09 PROCEDURE — 25000003 PHARM REV CODE 250: Performed by: PHYSICIAN ASSISTANT

## 2024-11-09 PROCEDURE — 36415 COLL VENOUS BLD VENIPUNCTURE: CPT | Performed by: PHYSICIAN ASSISTANT

## 2024-11-09 PROCEDURE — 36415 COLL VENOUS BLD VENIPUNCTURE: CPT | Performed by: SURGERY

## 2024-11-09 PROCEDURE — 21400001 HC TELEMETRY ROOM

## 2024-11-09 PROCEDURE — 86850 RBC ANTIBODY SCREEN: CPT | Performed by: SURGERY

## 2024-11-09 PROCEDURE — 86923 COMPATIBILITY TEST ELECTRIC: CPT | Performed by: PHYSICIAN ASSISTANT

## 2024-11-09 PROCEDURE — 25000003 PHARM REV CODE 250: Performed by: SURGERY

## 2024-11-09 PROCEDURE — 85025 COMPLETE CBC W/AUTO DIFF WBC: CPT | Performed by: SURGERY

## 2024-11-09 RX ORDER — HYDROCODONE BITARTRATE AND ACETAMINOPHEN 500; 5 MG/1; MG/1
TABLET ORAL
Status: DISCONTINUED | OUTPATIENT
Start: 2024-11-09 | End: 2024-11-10 | Stop reason: HOSPADM

## 2024-11-09 RX ADMIN — ALUMINUM HYDROXIDE, MAGNESIUM HYDROXIDE, AND SIMETHICONE 30 ML: 1200; 120; 1200 SUSPENSION ORAL at 04:11

## 2024-11-09 RX ADMIN — MUPIROCIN: 20 OINTMENT TOPICAL at 08:11

## 2024-11-09 RX ADMIN — ALUMINUM HYDROXIDE, MAGNESIUM HYDROXIDE, AND SIMETHICONE 30 ML: 1200; 120; 1200 SUSPENSION ORAL at 05:11

## 2024-11-09 RX ADMIN — ALUMINUM HYDROXIDE, MAGNESIUM HYDROXIDE, AND SIMETHICONE 30 ML: 1200; 120; 1200 SUSPENSION ORAL at 09:11

## 2024-11-09 RX ADMIN — CLOPIDOGREL BISULFATE 75 MG: 75 TABLET ORAL at 08:11

## 2024-11-09 RX ADMIN — CARVEDILOL 3.12 MG: 3.12 TABLET, FILM COATED ORAL at 08:11

## 2024-11-09 RX ADMIN — ATORVASTATIN CALCIUM 40 MG: 40 TABLET, FILM COATED ORAL at 08:11

## 2024-11-09 RX ADMIN — MUPIROCIN: 20 OINTMENT TOPICAL at 09:11

## 2024-11-09 RX ADMIN — CARVEDILOL 3.12 MG: 3.12 TABLET, FILM COATED ORAL at 04:11

## 2024-11-09 RX ADMIN — ASPIRIN 81 MG CHEWABLE TABLET 81 MG: 81 TABLET CHEWABLE at 08:11

## 2024-11-09 NOTE — PROGRESS NOTES
Vascular Surgery - Progress Note  Aldo Knott PA-C    Patient Name: Heriberto Qiu                   : 1960     MRN: [id number]   Date of Admission: 2024  Date of Exam: 2024     Subjective:     Post-Op Information:  REPAIR-ANEURYSM-ABDOMINAL AORTIC-ENDOVASCULAR (AAA) (N/A)  4 Days Post-Op    Interval History:  No acute events overnight.  Vitals stable, afebrile. Labs were this morning which shows improving kidney function however H&H did drop again after receiving 1 unit pRBCs 2 days ago.  Cardiology did sign off yesterday after troponins cleared and recommends keeping hemoglobin above 9 secondary to CAD and recent cardiac stent placement.  Patient is still complaining of soreness however no new symptoms.    Objective     Vital Signs (Most Recent):      Temp: 98.3 °F (36.8 °C) (24)  Pulse: 63 (24)  Resp: 16 (24)  BP: 110/64 (24)  SpO2: 100 % (24) Vital Signs (24h Range):    Temp:  [98 °F (36.7 °C)-99 °F (37.2 °C)] 98.3 °F (36.8 °C)  Pulse:  [60-71] 63  Resp:  [16-20] 16  SpO2:  [99 %-100 %] 100 %  BP: ()/(57-75) 110/64        Intake/Output - Last 3 Shifts         11/07 0700  11/08 0659 11/08 0700  11/09 0659 11/09 0700  11/10 0659    P.O. 280 1140     Blood 482.1      Total Intake(mL/kg) 762.1 (14.9) 1140 (22.1)     Urine (mL/kg/hr) 975 (0.8) 550 (0.4)     Stool 0 0     Total Output 975 550     Net -212.9 +590            Urine Occurrence  8 x     Stool Occurrence 0 x 0 x           Significant Labs:  CBC:   Recent Labs   Lab 24  0528   WBC 10.50   RBC 2.29*   HGB 7.0*   HCT 21.0*      MCV 91.7   MCH 30.6   MCHC 33.3     CMP:   Recent Labs   Lab 24  0720 24  0403   CALCIUM 8.8 8.3*   ALBUMIN 3.0*  --     136   K 4.4 4.2   CO2 28 27    103   BUN 38.3* 34.6*   CREATININE 1.67* 1.54*   ALKPHOS 55  --    ALT 8  --    AST 19  --    BILITOT 0.7  --      Physical Exam:     Constitutional: Awake, alert,  laying in bed eating breakfast, color improving  Cardiovascular: Doppler PT/DP signals bilaterally   Respiratory: Normal respiratory effort, on O2 per NC  Abdomen: No tenderness   Neurologic: Strength and sensation are equal bilaterally  Skin: Bilateral groin incisions are C/D/I; no hematoma present. Left groin is dressed, no blood noted      Assessment and Plan     Mr. Qiu is a 64 y.o. male with AAA s/p EVAR with right CFA endarterectomy and lithotripsy of bilateral iliac arteries. Troponins cleared yesterday; cardiology signed off, recommended keeping Hgb >9.  - Drop in H&H this morning to 7/21; no new bleeding events - 2 units pRBCs ordered with post-transfusion CBC   - Continue to encourage hydration, kidney function continuing to improve   - Continue work with PT, OOB and ambulation    The above findings, diagnostics, and treatment plan were discussed with Dr. Campbell who is in agreement with the plan of care except as stated in additional documentation.      Aldo Knott PA-C  Vascular Surgery  [facility]    Active Diagnoses:     There are no hospital problems to display for this patient.        Medications:     Continuous Infusion:     Scheduled Medications:    aluminum-magnesium hydroxide-simethicone  30 mL Oral QID (AC & HS)    aspirin  81 mg Oral Daily    atorvastatin  40 mg Oral Daily    carvediloL  3.125 mg Oral BID WM    clopidogreL  75 mg Oral Daily    mupirocin   Nasal BID       PRN Medications:   Current Facility-Administered Medications:     0.9%  NaCl infusion (for blood administration), , Intravenous, Q24H PRN    0.9%  NaCl infusion (for blood administration), , Intravenous, Q24H PRN    albuterol-ipratropium, 3 mL, Nebulization, Q6H PRN    dextrose 10%, 12.5 g, Intravenous, PRN    dextrose 10%, 25 g, Intravenous, PRN    HYDROcodone-acetaminophen, 1 tablet, Oral, Q4H PRN    melatonin, 6 mg, Oral, Nightly PRN    ondansetron, 4 mg, Intravenous, Q6H PRN    promethazine, 25 mg,  Intramuscular, Q8H PRN

## 2024-11-09 NOTE — PT/OT/SLP PROGRESS
Physical Therapy      Patient Name:  Heriberto Qiu   MRN:  [id number]    Patient not seen today secondary to Patient fatigue and low H&H. Will follow-up .

## 2024-11-10 VITALS
HEIGHT: 66 IN | TEMPERATURE: 98 F | WEIGHT: 114 LBS | SYSTOLIC BLOOD PRESSURE: 111 MMHG | OXYGEN SATURATION: 99 % | DIASTOLIC BLOOD PRESSURE: 66 MMHG | BODY MASS INDEX: 18.32 KG/M2 | HEART RATE: 53 BPM | RESPIRATION RATE: 20 BRPM

## 2024-11-10 PROBLEM — I71.43 INFRARENAL ABDOMINAL AORTIC ANEURYSM (AAA) WITHOUT RUPTURE: Status: ACTIVE | Noted: 2024-11-10

## 2024-11-10 LAB
ANION GAP SERPL CALC-SCNC: 5 MEQ/L
BASOPHILS # BLD AUTO: 0.03 X10(3)/MCL
BASOPHILS # BLD AUTO: 0.04 X10(3)/MCL
BASOPHILS NFR BLD AUTO: 0.3 %
BASOPHILS NFR BLD AUTO: 0.4 %
BUN SERPL-MCNC: 28.2 MG/DL (ref 8.4–25.7)
CALCIUM SERPL-MCNC: 8 MG/DL (ref 8.8–10)
CHLORIDE SERPL-SCNC: 104 MMOL/L (ref 98–107)
CO2 SERPL-SCNC: 28 MMOL/L (ref 23–31)
CREAT SERPL-MCNC: 1.32 MG/DL (ref 0.72–1.25)
CREAT/UREA NIT SERPL: 21
EOSINOPHIL # BLD AUTO: 0.21 X10(3)/MCL (ref 0–0.9)
EOSINOPHIL # BLD AUTO: 0.22 X10(3)/MCL (ref 0–0.9)
EOSINOPHIL NFR BLD AUTO: 2.1 %
EOSINOPHIL NFR BLD AUTO: 2.3 %
ERYTHROCYTE [DISTWIDTH] IN BLOOD BY AUTOMATED COUNT: 17.7 % (ref 11.5–17)
ERYTHROCYTE [DISTWIDTH] IN BLOOD BY AUTOMATED COUNT: 18 % (ref 11.5–17)
GFR SERPLBLD CREATININE-BSD FMLA CKD-EPI: 59 ML/MIN/1.73/M2
GLUCOSE SERPL-MCNC: 123 MG/DL (ref 82–115)
HCT VFR BLD AUTO: 25.7 % (ref 42–52)
HCT VFR BLD AUTO: 25.9 % (ref 42–52)
HGB BLD-MCNC: 8.7 G/DL (ref 14–18)
HGB BLD-MCNC: 8.9 G/DL (ref 14–18)
IMM GRANULOCYTES # BLD AUTO: 0.04 X10(3)/MCL (ref 0–0.04)
IMM GRANULOCYTES # BLD AUTO: 0.04 X10(3)/MCL (ref 0–0.04)
IMM GRANULOCYTES NFR BLD AUTO: 0.4 %
IMM GRANULOCYTES NFR BLD AUTO: 0.4 %
LYMPHOCYTES # BLD AUTO: 1.73 X10(3)/MCL (ref 0.6–4.6)
LYMPHOCYTES # BLD AUTO: 2 X10(3)/MCL (ref 0.6–4.6)
LYMPHOCYTES NFR BLD AUTO: 18.1 %
LYMPHOCYTES NFR BLD AUTO: 19.6 %
MCH RBC QN AUTO: 29 PG (ref 27–31)
MCH RBC QN AUTO: 30 PG (ref 27–31)
MCHC RBC AUTO-ENTMCNC: 33.6 G/DL (ref 33–36)
MCHC RBC AUTO-ENTMCNC: 34.6 G/DL (ref 33–36)
MCV RBC AUTO: 86.3 FL (ref 80–94)
MCV RBC AUTO: 86.5 FL (ref 80–94)
MONOCYTES # BLD AUTO: 0.62 X10(3)/MCL (ref 0.1–1.3)
MONOCYTES # BLD AUTO: 0.67 X10(3)/MCL (ref 0.1–1.3)
MONOCYTES NFR BLD AUTO: 6.5 %
MONOCYTES NFR BLD AUTO: 6.5 %
NEUTROPHILS # BLD AUTO: 6.91 X10(3)/MCL (ref 2.1–9.2)
NEUTROPHILS # BLD AUTO: 7.28 X10(3)/MCL (ref 2.1–9.2)
NEUTROPHILS NFR BLD AUTO: 71.1 %
NEUTROPHILS NFR BLD AUTO: 72.3 %
NRBC BLD AUTO-RTO: 0 %
NRBC BLD AUTO-RTO: 0 %
PLATELET # BLD AUTO: 122 X10(3)/MCL (ref 130–400)
PLATELET # BLD AUTO: 143 X10(3)/MCL (ref 130–400)
PMV BLD AUTO: 9.7 FL (ref 7.4–10.4)
PMV BLD AUTO: 9.8 FL (ref 7.4–10.4)
POTASSIUM SERPL-SCNC: 4.6 MMOL/L (ref 3.5–5.1)
RBC # BLD AUTO: 2.97 X10(6)/MCL (ref 4.7–6.1)
RBC # BLD AUTO: 3 X10(6)/MCL (ref 4.7–6.1)
SODIUM SERPL-SCNC: 137 MMOL/L (ref 136–145)
WBC # BLD AUTO: 10.23 X10(3)/MCL (ref 4.5–11.5)
WBC # BLD AUTO: 9.56 X10(3)/MCL (ref 4.5–11.5)

## 2024-11-10 PROCEDURE — 94799 UNLISTED PULMONARY SVC/PX: CPT

## 2024-11-10 PROCEDURE — 99900035 HC TECH TIME PER 15 MIN (STAT)

## 2024-11-10 PROCEDURE — 25000003 PHARM REV CODE 250: Performed by: PHYSICIAN ASSISTANT

## 2024-11-10 PROCEDURE — 25000003 PHARM REV CODE 250: Performed by: SURGERY

## 2024-11-10 PROCEDURE — 27000221 HC OXYGEN, UP TO 24 HOURS

## 2024-11-10 PROCEDURE — 80048 BASIC METABOLIC PNL TOTAL CA: CPT | Performed by: PHYSICIAN ASSISTANT

## 2024-11-10 PROCEDURE — 85025 COMPLETE CBC W/AUTO DIFF WBC: CPT | Performed by: PHYSICIAN ASSISTANT

## 2024-11-10 PROCEDURE — 36415 COLL VENOUS BLD VENIPUNCTURE: CPT | Performed by: PHYSICIAN ASSISTANT

## 2024-11-10 RX ORDER — HYDROCODONE BITARTRATE AND ACETAMINOPHEN 5; 325 MG/1; MG/1
1 TABLET ORAL EVERY 6 HOURS PRN
Qty: 20 TABLET | Refills: 0 | Status: SHIPPED | OUTPATIENT
Start: 2024-11-10

## 2024-11-10 RX ADMIN — ALUMINUM HYDROXIDE, MAGNESIUM HYDROXIDE, AND SIMETHICONE 30 ML: 1200; 120; 1200 SUSPENSION ORAL at 05:11

## 2024-11-10 RX ADMIN — ASPIRIN 81 MG CHEWABLE TABLET 81 MG: 81 TABLET CHEWABLE at 10:11

## 2024-11-10 RX ADMIN — MUPIROCIN: 20 OINTMENT TOPICAL at 10:11

## 2024-11-10 RX ADMIN — HYDROCODONE BITARTRATE AND ACETAMINOPHEN 1 TABLET: 5; 325 TABLET ORAL at 05:11

## 2024-11-10 RX ADMIN — ALUMINUM HYDROXIDE, MAGNESIUM HYDROXIDE, AND SIMETHICONE 30 ML: 1200; 120; 1200 SUSPENSION ORAL at 10:11

## 2024-11-10 RX ADMIN — HYDROCODONE BITARTRATE AND ACETAMINOPHEN 1 TABLET: 5; 325 TABLET ORAL at 12:11

## 2024-11-10 RX ADMIN — ATORVASTATIN CALCIUM 40 MG: 40 TABLET, FILM COATED ORAL at 10:11

## 2024-11-10 RX ADMIN — CLOPIDOGREL BISULFATE 75 MG: 75 TABLET ORAL at 10:11

## 2024-11-10 NOTE — PLAN OF CARE
Problem: Adult Inpatient Plan of Care  Goal: Plan of Care Review  Outcome: Progressing  Flowsheets (Taken 11/10/2024 0749)  Plan of Care Reviewed With: patient  Goal: Patient-Specific Goal (Individualized)  Outcome: Progressing  Flowsheets (Taken 11/10/2024 0749)  Individualized Care Needs: to go home  Anxieties, Fears or Concerns: hospitalzation  Patient/Family-Specific Goals (Include Timeframe): to go home  Goal: Absence of Hospital-Acquired Illness or Injury  Outcome: Progressing  Intervention: Identify and Manage Fall Risk  Flowsheets (Taken 11/10/2024 0749)  Safety Promotion/Fall Prevention:   assistive device/personal item within reach   nonskid shoes/socks when out of bed  Intervention: Prevent Skin Injury  Flowsheets (Taken 11/10/2024 0749)  Body Position: position changed independently  Skin Protection: incontinence pads utilized  Device Skin Pressure Protection:   adhesive use limited   positioning supports utilized  Intervention: Prevent and Manage VTE (Venous Thromboembolism) Risk  Flowsheets (Taken 11/10/2024 0749)  VTE Prevention/Management: remove, assess skin, and reapply sequential compression device  Intervention: Prevent Infection  Flowsheets (Taken 11/10/2024 0749)  Infection Prevention: hand hygiene promoted  Goal: Optimal Comfort and Wellbeing  Outcome: Progressing  Goal: Readiness for Transition of Care  Outcome: Progressing

## 2024-11-10 NOTE — PROGRESS NOTES
Vascular Surgery - Progress Note  Aldo Knott PA-C    Patient Name: Heriberto Qiu                   : 1960     MRN: [id number]   Date of Admission: 2024  Date of Exam: 11/10/2024     Subjective:     Post-Op Information:  REPAIR-ANEURYSM-ABDOMINAL AORTIC-ENDOVASCULAR (AAA) (N/A)  5 Days Post-Op    Interval History:  No acute events overnight.  Vitals stable, afebrile.  Patient did receive 2 units of packed red blood cells yesterday with improvement in H&H.  Patient said he has been getting up and walking around his room without issue.  States he feels much better and is ready to go home today.    Objective     Vital Signs (Most Recent):      Temp: 98.4 °F (36.9 °C) (11/10/24 0753)  Pulse: (!) 53 (11/10/24 0753)  Resp: 17 (11/10/24 0753)  BP: 111/66 (11/10/24 1009)  SpO2: 99 % (11/10/24 0753) Vital Signs (24h Range):    Temp:  [98.1 °F (36.7 °C)-99.9 °F (37.7 °C)] 98.4 °F (36.9 °C)  Pulse:  [53-75] 53  Resp:  [17-20] 17  SpO2:  [98 %-100 %] 99 %  BP: ()/(58-71) 111/66        Intake/Output - Last 3 Shifts          07 0659 11/09 0700  11/10 0659 11/10 07 0659    P.O. 1140 940     Blood  789.6     Total Intake(mL/kg) 1140 (22.1) 1729.6 (33.5)     Urine (mL/kg/hr) 550 (0.4) 1275 (1)     Stool 0 0     Total Output 550 1275     Net +590 +454.6            Urine Occurrence 8 x 1 x     Stool Occurrence 0 x 2 x           Significant Labs:  CBC:   Recent Labs   Lab 11/10/24  0721   WBC 9.56   RBC 2.97*   HGB 8.9*   HCT 25.7*      MCV 86.5   MCH 30.0   MCHC 34.6     CMP:   Recent Labs   Lab 11/10/24  0721   CALCIUM 8.0*      K 4.6   CO2 28      BUN 28.2*   CREATININE 1.32*       Significant Diagnostics:      Transthoracic echo (TTE) complete (2024)    Left Ventricle: Left ventricle was not well visualized due to poor sonic window. The left ventricle is normal in size. Normal wall thickness. There is low normal systolic function with a visually estimated  ejection fraction of 50 - 55%. Grade I diastolic dysfunction.    Right Ventricle: Systolic function is normal. TAPSE is 1.75 cm.    Aortic Valve: There is aortic valve sclerosis.    Mitral Valve: There is mild regurgitation.    IVC/SVC: Normal venous pressure at 3 mmHg.    Pericardium: There is no pericardial effusion.    Physical Exam:     Constitutional: Awake, alert, laying in bed   Cardiovascular: Doppler PT/DP signals bilaterally   Respiratory: Normal respiratory effort, on O2 per NC  Abdomen: No tenderness   Neurologic: Strength and sensation are equal bilaterally  Skin: Bilateral groin incisions are C/D/I; no hematoma present      Assessment and Plan     Mr. Qiu is a 64 y.o. male with AAA s/p EVAR with right CFA endarterectomy and lithotripsy of bilateral iliac arteries.   - H&H with significant improvement after 2 units pRBCs; symptoms much better   - Patient has been ambulating without issue - has rolling walker at home  - HH set up per case management  - Stable for d/c home today with clinic f/u    The above findings, diagnostics, and treatment plan were discussed with Dr. Campbell who is in agreement with the plan of care except as stated in additional documentation.      Aldo Knott PA-C  Vascular Surgery  [facility]    Active Diagnoses:     There are no hospital problems to display for this patient.        Medications:     Continuous Infusion:     Scheduled Medications:    aluminum-magnesium hydroxide-simethicone  30 mL Oral QID (AC & HS)    aspirin  81 mg Oral Daily    atorvastatin  40 mg Oral Daily    carvediloL  3.125 mg Oral BID WM    clopidogreL  75 mg Oral Daily    mupirocin   Nasal BID       PRN Medications:   Current Facility-Administered Medications:     0.9%  NaCl infusion (for blood administration), , Intravenous, Q24H PRN    0.9%  NaCl infusion (for blood administration), , Intravenous, Q24H PRN    albuterol-ipratropium, 3 mL, Nebulization, Q6H PRN    dextrose 10%,  12.5 g, Intravenous, PRN    dextrose 10%, 25 g, Intravenous, PRN    HYDROcodone-acetaminophen, 1 tablet, Oral, Q4H PRN    melatonin, 6 mg, Oral, Nightly PRN    ondansetron, 4 mg, Intravenous, Q6H PRN    promethazine, 25 mg, Intramuscular, Q8H PRN

## 2024-11-10 NOTE — PLAN OF CARE
Sent d/c order via [facility] to [employer] [facility] the [facility].

## 2024-11-11 ENCOUNTER — PATIENT OUTREACH (OUTPATIENT)
Dept: ADMINISTRATIVE | Facility: CLINIC | Age: 64
End: 2024-11-11
Payer: COMMERCIAL

## 2024-11-11 NOTE — PROGRESS NOTES
C3 nurse attempted to contact Heriberto Qiu for a TCC post hospital discharge follow up call. No answer. Left voicemail with callback information. The patient has a scheduled Post-Op appointment with Gerhard Platt MD (vascular surgery) on 12/2/24 @ 8:45am. The patient does not have an [facility] PCP, therefore, unable to route message to PCP staff for assistance with scheduling HOSFU visit.

## 2024-11-12 NOTE — PROGRESS NOTES
C3 nurse spoke with Heriberto Qiu for a TCC post hospital discharge follow up call. The patient does not have a scheduled [facility] appointment with Juan Carlos Oshea MD, or Dr. Lory Bay (cardiology) within 5-7 days post hospital discharge date 11/10/24. C3 nurse unable to route message to patient's PCP or Cardiology staff, as they are not [facility] physicians. The patient will call Dr. Bay's office for an appointment.      The patient has misplaced his DC paperwork and asked if this can be sent to Dr. Bay's office? The patient will call Dr. Bay's office for an appointment.     The patient also has a 4 week post-op appointment with Gerhard Platt MD (vascular surgery) on 12/2/24 @ 8:45am, but he asked if this can be a little later in the day, as he lives over an hour away from [place]. Message routed to Dr. Platt's staff for assistance with appointment.

## 2024-11-26 NOTE — PROGRESS NOTES
"    [facility] Vascular - Clinic Note  Gerhard Platt MD      Patient Name: Heriberto Qiu                   : 1960      MRN: [id number]   Visit Date: 2024       History Present Illness     Reason for Visit: Abdominal Aortic Aneurysm    Mr. Qiu presents to the clinic for a 4 week follow-up s/p endovascular aortic aneurysm repair on 24.  He reports he has done well since surgery.  He has no complaints.  He only reports some occasional lightheadedness due to his COPD.  He states his groin incisions have healed well.        REVIEW OF SYSTEMS:  12 point review of systems conducted, negative except as stated in the history of present illness. See HPI for details.        Physical Exam      Vitals:    24 1039 24 1041   BP: (!) 87/56 (!) 85/54   BP Location: Left arm Right arm   Patient Position: Sitting Sitting   Pulse: 67 63   Weight: 48.5 kg (107 lb)    Height: 5' 6" (1.676 m)           General: well-nourished, no acute distress, and healthy appearing, alert, pleasant, conversant, and oriented  Respiratory: breathing easily and without respiratory distress  Abdomen: normal, soft, no abdominal distention, and no palpable masses  Cardiology: regular rate and rhythm      Musculoskeletal:   Upper Extremity: normal bilateral hand function  Lower Extremity: no edema present to bilateral lower extremities     Post Operative Incision:   Location: bilateral groin  dry, no drainage, and healing appropriately            Assessment and Plan     Mr. Qiu is a 64 y.o. status post EVAR.  Overall he is doing well.  Postoperative imaging shows good exclusion of the aneurysm.  I would like to see him back in about 6 months with a repeat aortic duplex at that time.        1. Infrarenal abdominal aortic aneurysm (AAA) without rupture          Imaging Obtained/Reviewed   Study: aorta duplex  Date:   24  Shows an endograft in place with no evidence of endoleak.  Maximum aortic diameter 6.1 cm.  This is " unchanged from his prior preoperative imaging.      Medical History     Past Medical History:   Diagnosis Date    AAA (abdominal aortic aneurysm)     Anxiety     CAD (coronary artery disease)     CHF (congestive heart failure)     COPD (chronic obstructive pulmonary disease)     Digestive disorder     GERD    Heart attack     HTN (hypertension)     Infrarenal abdominal aortic aneurysm (AAA) without rupture     Insomnia     Ischemic cardiomyopathy 10/04/2024    Oxygen dependent     2-3L/nasal cannula continuous    Paroxysmal atrial fibrillation     PVD (peripheral vascular disease)     SOB (shortness of breath) on exertion      Past Surgical History:   Procedure Laterality Date    ABDOMINAL AORTIC ANEURYSM REPAIR, ENDOVASCULAR N/A 2024    Procedure: REPAIR-ANEURYSM-ABDOMINAL AORTIC-ENDOVASCULAR (AAA);  Surgeon: Gerhard Platt MD;  Location: [facility];  Service: Peripheral Vascular;  Laterality: N/A;  OR 12, vascular table    COLON RESECTION      COLONOSCOPY      LEFT HEART CATHETERIZATION      SMALL INTESTINE SURGERY       Family History   Problem Relation Name Age of Onset    Heart disease Mother      Heart attack Father       Social History     Socioeconomic History    Marital status: Other   Tobacco Use    Smoking status: Former     Current packs/day: 0.00     Types: Cigarettes     Quit date:      Years since quittin.9    Smokeless tobacco: Never   Substance and Sexual Activity    Alcohol use: Never    Drug use: Yes     Frequency: 3.0 times per week     Comment: gummies-medical marijuana    Sexual activity: Not Currently     Social Drivers of Health     Financial Resource Strain: Patient Declined (2024)    Overall Financial Resource Strain (CARDIA)     Difficulty of Paying Living Expenses: Patient declined   Food Insecurity: Patient Declined (2024)    Hunger Vital Sign     Worried About Running Out of Food in the Last Year: Patient declined     Ran Out of Food in the Last Year:  Patient declined   Transportation Needs: Patient Declined (11/5/2024)    TRANSPORTATION NEEDS     Transportation : Patient declined   Stress: Patient Declined (11/5/2024)    [employer] - Occupational Stress Questionnaire     Feeling of Stress : Patient declined   Housing Stability: Patient Declined (11/5/2024)    Housing Stability Vital Sign     Unable to Pay for Housing in the Last Year: Patient declined     Homeless in the Last Year: Patient declined     Current Outpatient Medications   Medication Instructions    ALPRAZolam (XANAX) 0.5 MG tablet 1 tablet, Nightly PRN    aspirin 81 MG Chew 1 tablet, Every morning    atorvastatin (LIPITOR) 40 MG tablet 1 tablet, Every morning    BREZTRI AEROSPHERE 160-9-4.8 mcg/actuation HFAA 1 puff, Daily    carvediloL (COREG) 3.125 mg, 2 times daily with meals    clopidogreL (PLAVIX) 75 mg tablet 1 tablet, Every morning    COMBIVENT RESPIMAT  mcg/actuation inhaler 1 puff, 4 times daily    lisinopriL (PRINIVIL,ZESTRIL) 5 MG tablet 1 tablet, Every morning    pantoprazole (PROTONIX) 40 MG tablet 1 tablet, Every morning    temazepam (RESTORIL) 30 mg capsule 1 capsule, Nightly PRN    torsemide (DEMADEX) 10 MG Tab 1 tablet, Every morning     Review of patient's allergies indicates:   Allergen Reactions    Cefadroxil Hives       Patient Care Team:  Juan Carlos Oshea MD as PCP - General  Lory Bay MD (Cardiovascular Disease)  Gerhard Platt MD as Consulting Physician (Vascular Surgery)  Caleb Mejia MD as Consulting Physician (Cardiology)        No follow-ups on file. In addition to their scheduled follow up, the patient has also been instructed to follow up on as needed basis.     No future appointments.

## 2024-12-04 ENCOUNTER — OFFICE VISIT (OUTPATIENT)
Dept: VASCULAR SURGERY | Facility: CLINIC | Age: 64
End: 2024-12-04
Attending: SURGERY
Payer: MEDICARE

## 2024-12-04 VITALS
HEIGHT: 66 IN | HEART RATE: 63 BPM | DIASTOLIC BLOOD PRESSURE: 54 MMHG | BODY MASS INDEX: 17.19 KG/M2 | WEIGHT: 107 LBS | SYSTOLIC BLOOD PRESSURE: 85 MMHG

## 2024-12-04 DIAGNOSIS — I71.43 INFRARENAL ABDOMINAL AORTIC ANEURYSM (AAA) WITHOUT RUPTURE: Primary | ICD-10-CM

## 2025-06-10 NOTE — PROGRESS NOTES
"    [facility] Vascular - Clinic Note  Gerhard Platt MD      Patient Name: Heriberto Qiu                   : 1960      MRN: [id number]   Visit Date: 2025       History Present Illness     Reason for Visit: Abdominal Aortic Aneurysm    Mr. Qiu presents to the clinic for 6 month surveillance of his abdominal aortic aneurysm. He is sp/ endovascular aortic aneurysm repair on 24. He denies any changes to his health since last office visit. He is scheduled to have cataract surgery soon. He denies any acute abdominal or back pain.       REVIEW OF SYSTEMS:  12 point review of systems conducted, negative except as stated in the history of present illness. See HPI for details.        Physical Exam      Vitals:    25 1020 25 1022   BP: (!) 157/84 (!) 159/88   BP Location: Right arm Left arm   Patient Position: Sitting Sitting   Pulse: (!) 47 (!) 48   Weight: 54 kg (119 lb)    Height: 5' 6" (1.676 m)           General: well-nourished, no acute distress, and healthy appearing, alert, pleasant, conversant, and oriented  Neurologic: cranial nerves are grossly intact, no neurologic deficits, no motor deficits, and no sensory deficits  Neck/Chest: normal , soft without lymphadenopathy, and no carotid bruits noted  Respiratory: breathing easily, without respiratory distress, and normal breath sounds  Abdomen: normal, soft, and no palpable masses  Cardiology: regular rate and rhythm and no audible murmur    Upper Extremity Arterial Exam:   Right - radial is palpable and brachial is palpable  Left - radial is palpable and brachial is palpable      Musculoskeletal:   Upper Extremity: normal bilateral hand function  Lower Extremity: no edema present to bilateral lower extremities           Assessment and Plan     Mr. Qiu is a 64 y.o. with an abdominal aortic aneurysm.  He  is s/p Endovascular Aneurysm Repair on 24. He denies acute back or abdominal pain. His previous aorta duplex obtained 24 " demonstrates a  6.1 cm AAA. Duplex obtained today reveals a decrease in aneurysm sac measuring approximately 5.5 cm without evidence of endoleak. Recommend 1 year follow up with abdominal duplex.         1. Infrarenal abdominal aortic aneurysm (AAA) without rupture          Imaging Obtained/Reviewed   Study: aorta duplex  Date:   6/18/25        Medical History     Past Medical History:   Diagnosis Date    AAA (abdominal aortic aneurysm)     Anxiety     CAD (coronary artery disease)     CHF (congestive heart failure)     COPD (chronic obstructive pulmonary disease)     Digestive disorder     GERD    Heart attack 2023    HTN (hypertension)     Infrarenal abdominal aortic aneurysm (AAA) without rupture     Insomnia     Ischemic cardiomyopathy 10/04/2024    Paroxysmal atrial fibrillation     PVD (peripheral vascular disease)     SOB (shortness of breath) on exertion      Past Surgical History:   Procedure Laterality Date    ABDOMINAL AORTIC ANEURYSM REPAIR, ENDOVASCULAR N/A 11/5/2024    Procedure: REPAIR-ANEURYSM-ABDOMINAL AORTIC-ENDOVASCULAR (AAA);  Surgeon: Gerhard Platt MD;  Location: [facility] OR;  Service: Peripheral Vascular;  Laterality: N/A;  OR 12, vascular table    COLON RESECTION  2014    COLONOSCOPY      LEFT HEART CATHETERIZATION      SMALL INTESTINE SURGERY       Family History   Problem Relation Name Age of Onset    Heart disease Mother      Heart attack Father       Social History[1]  Current Outpatient Medications   Medication Instructions    ALPRAZolam (XANAX) 0.5 MG tablet 1 tablet, Nightly PRN    aspirin 81 MG Chew 1 tablet, Every morning    atorvastatin (LIPITOR) 40 MG tablet 1 tablet, Every morning    BREZTRI AEROSPHERE 160-9-4.8 mcg/actuation HFAA 1 puff, Daily    carvediloL (COREG) 3.125 mg, 2 times daily with meals    clopidogreL (PLAVIX) 75 mg tablet 1 tablet, Every morning    COMBIVENT RESPIMAT  mcg/actuation inhaler 1 puff, 4 times daily    empagliflozin (JARDIANCE) 10 mg, Daily     pantoprazole (PROTONIX) 40 MG tablet 1 tablet, Every morning    sacubitriL-valsartan (ENTRESTO) 24-26 mg per tablet 1 tablet, 2 times daily    temazepam (RESTORIL) 30 mg capsule 1 capsule, Nightly PRN    torsemide (DEMADEX) 10 MG Tab 1 tablet, Every morning     Review of patient's allergies indicates:   Allergen Reactions    Cefadroxil Hives       Patient Care Team:  Juan Carlos Oshea MD as PCP - General  Lory Bay MD (Cardiovascular Disease)  Gerhard Platt MD as Consulting Physician (Vascular Surgery)  Caleb Mejia MD as Consulting Physician (Cardiology)        No follow-ups on file. In addition to their scheduled follow up, the patient has also been instructed to follow up on as needed basis.     Future Appointments   Date Time Provider Department Center   6/18/2025 10:45 AM CV [facility] VASCULAR [facility]   6/18/2025 11:00 AM Gerhard Platt MD [facility]             [1]   Social History  Socioeconomic History    Marital status: Other   Tobacco Use    Smoking status: Former     Current packs/day: 0.00     Types: Cigarettes     Quit date: 2022     Years since quitting: 3.4    Smokeless tobacco: Never   Substance and Sexual Activity    Alcohol use: Never    Drug use: Yes     Frequency: 3.0 times per week     Comment: gummies-medical marijuana    Sexual activity: Not Currently     Social Drivers of Health     Financial Resource Strain: Patient Declined (11/5/2024)    Overall Financial Resource Strain (CARDIA)     Difficulty of Paying Living Expenses: Patient declined   Food Insecurity: Patient Declined (11/5/2024)    Hunger Vital Sign     Worried About Running Out of Food in the Last Year: Patient declined     Ran Out of Food in the Last Year: Patient declined   Transportation Needs: Patient Declined (11/5/2024)    TRANSPORTATION NEEDS     Transportation : Patient declined   Stress: Patient Declined (11/5/2024)    [employer] -  Occupational Stress Questionnaire     Feeling of Stress : Patient declined   Housing Stability: Patient Declined (11/5/2024)    Housing Stability Vital Sign     Unable to Pay for Housing in the Last Year: Patient declined     Homeless in the Last Year: Patient declined

## 2025-06-18 ENCOUNTER — OFFICE VISIT (OUTPATIENT)
Dept: VASCULAR SURGERY | Facility: CLINIC | Age: 65
End: 2025-06-18
Attending: SURGERY
Payer: MEDICARE

## 2025-06-18 VITALS
HEART RATE: 48 BPM | DIASTOLIC BLOOD PRESSURE: 88 MMHG | WEIGHT: 119 LBS | SYSTOLIC BLOOD PRESSURE: 159 MMHG | BODY MASS INDEX: 19.13 KG/M2 | HEIGHT: 66 IN

## 2025-06-18 DIAGNOSIS — I71.43 INFRARENAL ABDOMINAL AORTIC ANEURYSM (AAA) WITHOUT RUPTURE: Primary | ICD-10-CM

## 2025-06-18 PROCEDURE — 99214 OFFICE O/P EST MOD 30 MIN: CPT | Mod: ,,, | Performed by: SURGERY

## (undated) DEVICE — BAG MEDI-PLAST DECANTER C-FLOW

## (undated) DEVICE — SYR 10CC LUER LOCK

## (undated) DEVICE — FLEXSHEATH DRYSEAL 16FR 33CM

## (undated) DEVICE — WIRE LUNDERQUIST 260

## (undated) DEVICE — ADHESIVE DERMABOND ADVANCED

## (undated) DEVICE — KIT MICROINTRO 4F 7CM 4FR 21GA

## (undated) DEVICE — TOWEL OR DISP STRL BLUE 4/PK

## (undated) DEVICE — DRAPE FULL SHEET 70X100IN

## (undated) DEVICE — DRAPE MEDIUM SHEET 40X70IN

## (undated) DEVICE — APPLICATOR CHLORAPREP ORN 26ML

## (undated) DEVICE — INTRODUCER CATH 8F 11CM

## (undated) DEVICE — GLOVE PROTEXIS LTX MICRO 6.5

## (undated) DEVICE — DRAPE C-ARM COVER EZ 36X28IN

## (undated) DEVICE — CATH GLIDE ANGLED 5FR 65CM

## (undated) DEVICE — PROBE DOPPLER VTI DISP 8MHZ

## (undated) DEVICE — CATH BLLN ENDOVSC

## (undated) DEVICE — DRAPE INCISE IOBAN 2 23X23IN

## (undated) DEVICE — INTRODUCER CATH 6F 11CM

## (undated) DEVICE — COVER PROBE US 5.5X58L NON LTX

## (undated) DEVICE — TRAY CATH FOL SIL URIMTR 16FR

## (undated) DEVICE — VISE RADIFOCUS MULTI TORQUE

## (undated) DEVICE — NDL PERC ENTRY BSDN 18-7.0

## (undated) DEVICE — KIT SURGICAL TURNOVER

## (undated) DEVICE — SUT PROLENE 5-0 36IN C-1

## (undated) DEVICE — COVER HANDLE LIGHT RIGID

## (undated) DEVICE — SYR 30CC LUER LOCK

## (undated) DEVICE — SOL NORMAL USPCA 0.9%

## (undated) DEVICE — Device

## (undated) DEVICE — CATH SHCKWAVE M5+ IVL 6.0X60MM

## (undated) DEVICE — IMPLANTABLE DEVICE: Type: IMPLANTABLE DEVICE | Site: AORTA | Status: NON-FUNCTIONAL

## (undated) DEVICE — TUBING CNTRST INJ ADPT 72IN

## (undated) DEVICE — DRAPE TOP 53X102IN

## (undated) DEVICE — DEVICE PERCLOSE SUT CLSR 6FR

## (undated) DEVICE — KIT MANIFOLD LOW PRESS TUBING

## (undated) DEVICE — HOLDER STRIP-T SELF ADH 2X10IN

## (undated) DEVICE — ELECTRODE PATIENT RETURN DISP

## (undated) DEVICE — GLOVE PROTEXIS PI SYN SURG 7.5

## (undated) DEVICE — BOWL GUIDEWIRE 8.5IN

## (undated) DEVICE — GUIDEWIRE STF .035X180CM ANG

## (undated) DEVICE — CLIP LIGATING MEDIUM

## (undated) DEVICE — GUIDEWIRE GLADIUS STR 300CM

## (undated) DEVICE — FLEXSHEATH DRYSEAL 12FR 33CM

## (undated) DEVICE — GUIDEWIRE ADVNTG 035X260CM ANG

## (undated) DEVICE — KIT SURGIFLO HEMOSTATIC MATRIX

## (undated) DEVICE — KIT HAND CONTROL HIGH PRESSUR

## (undated) DEVICE — BOWL STERILE LARGE 32OZ

## (undated) DEVICE — DEVICE BASIXCOMPAK INFL 20ML

## (undated) DEVICE — KIT SYR REUSABLE